# Patient Record
Sex: MALE | Race: BLACK OR AFRICAN AMERICAN | NOT HISPANIC OR LATINO | Employment: FULL TIME | RURAL
[De-identification: names, ages, dates, MRNs, and addresses within clinical notes are randomized per-mention and may not be internally consistent; named-entity substitution may affect disease eponyms.]

---

## 2021-04-18 ENCOUNTER — OFFICE VISIT (OUTPATIENT)
Dept: FAMILY MEDICINE | Facility: CLINIC | Age: 56
End: 2021-04-18
Payer: COMMERCIAL

## 2021-04-18 VITALS
HEART RATE: 61 BPM | OXYGEN SATURATION: 98 % | WEIGHT: 225 LBS | HEIGHT: 75 IN | DIASTOLIC BLOOD PRESSURE: 94 MMHG | TEMPERATURE: 98 F | RESPIRATION RATE: 18 BRPM | BODY MASS INDEX: 27.98 KG/M2 | SYSTOLIC BLOOD PRESSURE: 159 MMHG

## 2021-04-18 DIAGNOSIS — I10 ESSENTIAL HYPERTENSION: Primary | ICD-10-CM

## 2021-04-18 LAB
ANION GAP SERPL CALCULATED.3IONS-SCNC: 11 MMOL/L (ref 7–16)
BUN SERPL-MCNC: 14 MG/DL (ref 7–18)
BUN/CREAT SERPL: 16 (ref 6–20)
CALCIUM SERPL-MCNC: 8.9 MG/DL (ref 8.5–10.1)
CHLORIDE SERPL-SCNC: 106 MMOL/L (ref 98–107)
CHOLEST SERPL-MCNC: 244 MG/DL (ref 0–200)
CHOLEST/HDLC SERPL: 4.9 {RATIO}
CO2 SERPL-SCNC: 27 MMOL/L (ref 21–32)
CREAT SERPL-MCNC: 0.9 MG/DL (ref 0.7–1.3)
GLUCOSE SERPL-MCNC: 95 MG/DL (ref 74–106)
HDLC SERPL-MCNC: 50 MG/DL (ref 40–60)
LDLC SERPL CALC-MCNC: 162 MG/DL
LDLC/HDLC SERPL: 3.2 {RATIO}
NONHDLC SERPL-MCNC: 194 MG/DL
POTASSIUM SERPL-SCNC: 4.1 MMOL/L (ref 3.5–5.1)
SODIUM SERPL-SCNC: 140 MMOL/L (ref 136–145)
TRIGL SERPL-MCNC: 161 MG/DL (ref 35–150)
VLDLC SERPL-MCNC: 32 MG/DL

## 2021-04-18 PROCEDURE — 80048 BASIC METABOLIC PANEL: ICD-10-PCS | Mod: QW,,, | Performed by: CLINICAL MEDICAL LABORATORY

## 2021-04-18 PROCEDURE — 80048 BASIC METABOLIC PNL TOTAL CA: CPT | Mod: QW,,, | Performed by: CLINICAL MEDICAL LABORATORY

## 2021-04-18 PROCEDURE — 99203 PR OFFICE/OUTPT VISIT, NEW, LEVL III, 30-44 MIN: ICD-10-PCS | Mod: ,,, | Performed by: FAMILY MEDICINE

## 2021-04-18 PROCEDURE — 99203 OFFICE O/P NEW LOW 30 MIN: CPT | Mod: ,,, | Performed by: FAMILY MEDICINE

## 2021-04-18 PROCEDURE — 80061 LIPID PANEL: CPT | Mod: QW,,, | Performed by: CLINICAL MEDICAL LABORATORY

## 2021-04-18 PROCEDURE — 80061 LIPID PANEL: ICD-10-PCS | Mod: QW,,, | Performed by: CLINICAL MEDICAL LABORATORY

## 2021-04-18 RX ORDER — AMLODIPINE BESYLATE 5 MG/1
5 TABLET ORAL DAILY
Qty: 30 TABLET | Refills: 5 | Status: SHIPPED | OUTPATIENT
Start: 2021-04-18 | End: 2022-04-18

## 2021-04-23 RX ORDER — ATORVASTATIN CALCIUM 20 MG/1
20 TABLET, FILM COATED ORAL DAILY
Qty: 30 TABLET | Refills: 5 | Status: SHIPPED | OUTPATIENT
Start: 2021-04-23 | End: 2021-10-13 | Stop reason: SDUPTHER

## 2021-05-01 ENCOUNTER — OFFICE VISIT (OUTPATIENT)
Dept: FAMILY MEDICINE | Facility: CLINIC | Age: 56
End: 2021-05-01
Payer: COMMERCIAL

## 2021-05-01 VITALS
WEIGHT: 247.19 LBS | DIASTOLIC BLOOD PRESSURE: 92 MMHG | TEMPERATURE: 97 F | HEIGHT: 75 IN | BODY MASS INDEX: 30.73 KG/M2 | HEART RATE: 68 BPM | SYSTOLIC BLOOD PRESSURE: 142 MMHG | RESPIRATION RATE: 16 BRPM | OXYGEN SATURATION: 99 %

## 2021-05-01 DIAGNOSIS — I10 ESSENTIAL HYPERTENSION: ICD-10-CM

## 2021-05-01 DIAGNOSIS — M19.90 ARTHRITIS: ICD-10-CM

## 2021-05-01 DIAGNOSIS — M25.562 ACUTE PAIN OF LEFT KNEE: Primary | ICD-10-CM

## 2021-05-01 PROCEDURE — 99214 PR OFFICE/OUTPT VISIT, EST, LEVL IV, 30-39 MIN: ICD-10-PCS | Mod: 25,,, | Performed by: FAMILY MEDICINE

## 2021-05-01 PROCEDURE — 96372 PR INJECTION,THERAP/PROPH/DIAG2ST, IM OR SUBCUT: ICD-10-PCS | Mod: ,,, | Performed by: FAMILY MEDICINE

## 2021-05-01 PROCEDURE — 99214 OFFICE O/P EST MOD 30 MIN: CPT | Mod: 25,,, | Performed by: FAMILY MEDICINE

## 2021-05-01 PROCEDURE — 96372 THER/PROPH/DIAG INJ SC/IM: CPT | Mod: ,,, | Performed by: FAMILY MEDICINE

## 2021-05-01 RX ORDER — AMLODIPINE BESYLATE 10 MG/1
10 TABLET ORAL DAILY
Qty: 90 TABLET | Refills: 1 | Status: SHIPPED | OUTPATIENT
Start: 2021-05-01 | End: 2021-10-13 | Stop reason: SDUPTHER

## 2021-05-01 RX ORDER — TIZANIDINE 4 MG/1
4 TABLET ORAL 3 TIMES DAILY PRN
Qty: 20 TABLET | Refills: 0 | Status: SHIPPED | OUTPATIENT
Start: 2021-05-01 | End: 2021-05-11

## 2021-05-01 RX ORDER — IBUPROFEN 600 MG/1
600 TABLET ORAL EVERY 6 HOURS PRN
Qty: 20 TABLET | Refills: 0 | Status: SHIPPED | OUTPATIENT
Start: 2021-05-01 | End: 2022-08-09 | Stop reason: ALTCHOICE

## 2021-05-01 RX ORDER — KETOROLAC TROMETHAMINE 30 MG/ML
60 INJECTION, SOLUTION INTRAMUSCULAR; INTRAVENOUS
Status: COMPLETED | OUTPATIENT
Start: 2021-05-01 | End: 2021-05-01

## 2021-05-01 RX ORDER — PREDNISONE 20 MG/1
20 TABLET ORAL DAILY
Qty: 5 TABLET | Refills: 0 | Status: SHIPPED | OUTPATIENT
Start: 2021-05-01 | End: 2021-05-06

## 2021-05-01 RX ORDER — DEXAMETHASONE SODIUM PHOSPHATE 4 MG/ML
4 INJECTION, SOLUTION INTRA-ARTICULAR; INTRALESIONAL; INTRAMUSCULAR; INTRAVENOUS; SOFT TISSUE
Status: COMPLETED | OUTPATIENT
Start: 2021-05-01 | End: 2021-05-01

## 2021-05-01 RX ADMIN — KETOROLAC TROMETHAMINE 60 MG: 30 INJECTION, SOLUTION INTRAMUSCULAR; INTRAVENOUS at 01:05

## 2021-05-01 RX ADMIN — DEXAMETHASONE SODIUM PHOSPHATE 4 MG: 4 INJECTION, SOLUTION INTRA-ARTICULAR; INTRALESIONAL; INTRAMUSCULAR; INTRAVENOUS; SOFT TISSUE at 01:05

## 2021-05-10 ENCOUNTER — TELEPHONE (OUTPATIENT)
Dept: FAMILY MEDICINE | Facility: CLINIC | Age: 56
End: 2021-05-10

## 2021-05-11 ENCOUNTER — TELEPHONE (OUTPATIENT)
Dept: FAMILY MEDICINE | Facility: CLINIC | Age: 56
End: 2021-05-11

## 2021-06-27 ENCOUNTER — OFFICE VISIT (OUTPATIENT)
Dept: FAMILY MEDICINE | Facility: CLINIC | Age: 56
End: 2021-06-27
Payer: COMMERCIAL

## 2021-06-27 VITALS
TEMPERATURE: 98 F | BODY MASS INDEX: 30.71 KG/M2 | WEIGHT: 247 LBS | SYSTOLIC BLOOD PRESSURE: 142 MMHG | HEART RATE: 68 BPM | OXYGEN SATURATION: 99 % | RESPIRATION RATE: 18 BRPM | HEIGHT: 75 IN | DIASTOLIC BLOOD PRESSURE: 78 MMHG

## 2021-06-27 DIAGNOSIS — J32.9 SINUSITIS, UNSPECIFIED CHRONICITY, UNSPECIFIED LOCATION: Primary | ICD-10-CM

## 2021-06-27 PROCEDURE — 96372 PR INJECTION,THERAP/PROPH/DIAG2ST, IM OR SUBCUT: ICD-10-PCS | Mod: ,,, | Performed by: FAMILY MEDICINE

## 2021-06-27 PROCEDURE — 99214 PR OFFICE/OUTPT VISIT, EST, LEVL IV, 30-39 MIN: ICD-10-PCS | Mod: 25,,, | Performed by: FAMILY MEDICINE

## 2021-06-27 PROCEDURE — 99214 OFFICE O/P EST MOD 30 MIN: CPT | Mod: 25,,, | Performed by: FAMILY MEDICINE

## 2021-06-27 PROCEDURE — 99051 PR MEDICAL SERVICES, EVE/WKEND/HOLIDAY: ICD-10-PCS | Mod: ,,, | Performed by: FAMILY MEDICINE

## 2021-06-27 PROCEDURE — 99051 MED SERV EVE/WKEND/HOLIDAY: CPT | Mod: ,,, | Performed by: FAMILY MEDICINE

## 2021-06-27 PROCEDURE — 96372 THER/PROPH/DIAG INJ SC/IM: CPT | Mod: ,,, | Performed by: FAMILY MEDICINE

## 2021-06-27 RX ORDER — CEFTRIAXONE 1 G/1
1 INJECTION, POWDER, FOR SOLUTION INTRAMUSCULAR; INTRAVENOUS
Status: COMPLETED | OUTPATIENT
Start: 2021-06-27 | End: 2021-06-27

## 2021-06-27 RX ORDER — DEXAMETHASONE SODIUM PHOSPHATE 4 MG/ML
4 INJECTION, SOLUTION INTRA-ARTICULAR; INTRALESIONAL; INTRAMUSCULAR; INTRAVENOUS; SOFT TISSUE
Status: COMPLETED | OUTPATIENT
Start: 2021-06-27 | End: 2021-06-27

## 2021-06-27 RX ORDER — AMOXICILLIN AND CLAVULANATE POTASSIUM 875; 125 MG/1; MG/1
1 TABLET, FILM COATED ORAL 2 TIMES DAILY
Qty: 14 TABLET | Refills: 0 | Status: SHIPPED | OUTPATIENT
Start: 2021-06-27 | End: 2021-07-04

## 2021-06-27 RX ORDER — PREDNISONE 20 MG/1
20 TABLET ORAL DAILY
Qty: 5 TABLET | Refills: 0 | Status: SHIPPED | OUTPATIENT
Start: 2021-06-27 | End: 2021-07-02

## 2021-06-27 RX ADMIN — CEFTRIAXONE 1 G: 1 INJECTION, POWDER, FOR SOLUTION INTRAMUSCULAR; INTRAVENOUS at 10:06

## 2021-06-27 RX ADMIN — DEXAMETHASONE SODIUM PHOSPHATE 4 MG: 4 INJECTION, SOLUTION INTRA-ARTICULAR; INTRALESIONAL; INTRAMUSCULAR; INTRAVENOUS; SOFT TISSUE at 10:06

## 2021-10-12 ENCOUNTER — CLINICAL SUPPORT (OUTPATIENT)
Dept: PRIMARY CARE CLINIC | Facility: CLINIC | Age: 56
End: 2021-10-12

## 2021-10-12 DIAGNOSIS — Z02.89 ENCOUNTER FOR PHYSICAL EXAMINATION RELATED TO EMPLOYMENT: ICD-10-CM

## 2021-10-12 PROCEDURE — 99499 PR PHYSICAL - BASIC NON DOT/CDL: ICD-10-PCS | Mod: ,,, | Performed by: NURSE PRACTITIONER

## 2021-10-12 PROCEDURE — 99499 UNLISTED E&M SERVICE: CPT | Mod: ,,, | Performed by: NURSE PRACTITIONER

## 2021-10-13 ENCOUNTER — OFFICE VISIT (OUTPATIENT)
Dept: FAMILY MEDICINE | Facility: CLINIC | Age: 56
End: 2021-10-13
Payer: COMMERCIAL

## 2021-10-13 VITALS
DIASTOLIC BLOOD PRESSURE: 80 MMHG | RESPIRATION RATE: 18 BRPM | OXYGEN SATURATION: 98 % | BODY MASS INDEX: 30.85 KG/M2 | HEART RATE: 75 BPM | TEMPERATURE: 97 F | SYSTOLIC BLOOD PRESSURE: 130 MMHG | WEIGHT: 246.81 LBS

## 2021-10-13 DIAGNOSIS — I10 PRIMARY HYPERTENSION: ICD-10-CM

## 2021-10-13 DIAGNOSIS — E78.5 HYPERLIPIDEMIA, UNSPECIFIED HYPERLIPIDEMIA TYPE: ICD-10-CM

## 2021-10-13 DIAGNOSIS — R55 SYNCOPE, UNSPECIFIED SYNCOPE TYPE: Primary | ICD-10-CM

## 2021-10-13 LAB
ALBUMIN SERPL BCP-MCNC: 4.1 G/DL (ref 3.5–5)
ALBUMIN/GLOB SERPL: 1 {RATIO}
ALP SERPL-CCNC: 67 U/L (ref 45–115)
ALT SERPL W P-5'-P-CCNC: 22 U/L (ref 16–61)
ANION GAP SERPL CALCULATED.3IONS-SCNC: 7 MMOL/L (ref 7–16)
AST SERPL W P-5'-P-CCNC: 11 U/L (ref 15–37)
BASOPHILS # BLD AUTO: 0.03 K/UL (ref 0–0.2)
BASOPHILS NFR BLD AUTO: 0.6 % (ref 0–1)
BILIRUB SERPL-MCNC: 0.4 MG/DL (ref 0–1.2)
BUN SERPL-MCNC: 15 MG/DL (ref 7–18)
BUN/CREAT SERPL: 15 (ref 6–20)
CALCIUM SERPL-MCNC: 9.6 MG/DL (ref 8.5–10.1)
CHLORIDE SERPL-SCNC: 107 MMOL/L (ref 98–107)
CO2 SERPL-SCNC: 27 MMOL/L (ref 21–32)
CREAT SERPL-MCNC: 0.99 MG/DL (ref 0.7–1.3)
DIFFERENTIAL METHOD BLD: ABNORMAL
EOSINOPHIL # BLD AUTO: 0.14 K/UL (ref 0–0.5)
EOSINOPHIL NFR BLD AUTO: 2.9 % (ref 1–4)
ERYTHROCYTE [DISTWIDTH] IN BLOOD BY AUTOMATED COUNT: 14 % (ref 11.5–14.5)
EST. AVERAGE GLUCOSE BLD GHB EST-MCNC: 100 MG/DL
GLOBULIN SER-MCNC: 4.1 G/DL (ref 2–4)
GLUCOSE SERPL-MCNC: 95 MG/DL (ref 74–106)
GLUCOSE SERPL-MCNC: 99 MG/DL (ref 70–110)
HBA1C MFR BLD HPLC: 5.6 % (ref 4.5–6.6)
HCT VFR BLD AUTO: 43.6 % (ref 40–54)
HGB BLD-MCNC: 14.5 G/DL (ref 13.5–18)
IMM GRANULOCYTES # BLD AUTO: 0.01 K/UL (ref 0–0.04)
IMM GRANULOCYTES NFR BLD: 0.2 % (ref 0–0.4)
LYMPHOCYTES # BLD AUTO: 2.42 K/UL (ref 1–4.8)
LYMPHOCYTES NFR BLD AUTO: 50.3 % (ref 27–41)
MCH RBC QN AUTO: 30.3 PG (ref 27–31)
MCHC RBC AUTO-ENTMCNC: 33.3 G/DL (ref 32–36)
MCV RBC AUTO: 91 FL (ref 80–96)
MONOCYTES # BLD AUTO: 0.44 K/UL (ref 0–0.8)
MONOCYTES NFR BLD AUTO: 9.1 % (ref 2–6)
MPC BLD CALC-MCNC: 10.7 FL (ref 9.4–12.4)
NEUTROPHILS # BLD AUTO: 1.77 K/UL (ref 1.8–7.7)
NEUTROPHILS NFR BLD AUTO: 36.9 % (ref 53–65)
NRBC # BLD AUTO: 0 X10E3/UL
NRBC, AUTO (.00): 0 %
PLATELET # BLD AUTO: 242 K/UL (ref 150–400)
POTASSIUM SERPL-SCNC: 3.9 MMOL/L (ref 3.5–5.1)
PROT SERPL-MCNC: 8.2 G/DL (ref 6.4–8.2)
RBC # BLD AUTO: 4.79 M/UL (ref 4.6–6.2)
SODIUM SERPL-SCNC: 137 MMOL/L (ref 136–145)
WBC # BLD AUTO: 4.81 K/UL (ref 4.5–11)

## 2021-10-13 PROCEDURE — 1160F RVW MEDS BY RX/DR IN RCRD: CPT | Mod: ,,, | Performed by: FAMILY MEDICINE

## 2021-10-13 PROCEDURE — 3079F PR MOST RECENT DIASTOLIC BLOOD PRESSURE 80-89 MM HG: ICD-10-PCS | Mod: ,,, | Performed by: FAMILY MEDICINE

## 2021-10-13 PROCEDURE — 3044F PR MOST RECENT HEMOGLOBIN A1C LEVEL <7.0%: ICD-10-PCS | Mod: ,,, | Performed by: FAMILY MEDICINE

## 2021-10-13 PROCEDURE — 1159F PR MEDICATION LIST DOCUMENTED IN MEDICAL RECORD: ICD-10-PCS | Mod: ,,, | Performed by: FAMILY MEDICINE

## 2021-10-13 PROCEDURE — 3008F BODY MASS INDEX DOCD: CPT | Mod: ,,, | Performed by: FAMILY MEDICINE

## 2021-10-13 PROCEDURE — 1159F MED LIST DOCD IN RCRD: CPT | Mod: ,,, | Performed by: FAMILY MEDICINE

## 2021-10-13 PROCEDURE — 80053 COMPREHENSIVE METABOLIC PANEL: ICD-10-PCS | Mod: ICN,,, | Performed by: CLINICAL MEDICAL LABORATORY

## 2021-10-13 PROCEDURE — 85025 COMPLETE CBC W/AUTO DIFF WBC: CPT | Mod: ICN,,, | Performed by: CLINICAL MEDICAL LABORATORY

## 2021-10-13 PROCEDURE — 83036 HEMOGLOBIN A1C: ICD-10-PCS | Mod: ICN,,, | Performed by: CLINICAL MEDICAL LABORATORY

## 2021-10-13 PROCEDURE — 3008F PR BODY MASS INDEX (BMI) DOCUMENTED: ICD-10-PCS | Mod: ,,, | Performed by: FAMILY MEDICINE

## 2021-10-13 PROCEDURE — 3044F HG A1C LEVEL LT 7.0%: CPT | Mod: ,,, | Performed by: FAMILY MEDICINE

## 2021-10-13 PROCEDURE — 85025 CBC WITH DIFFERENTIAL: ICD-10-PCS | Mod: ICN,,, | Performed by: CLINICAL MEDICAL LABORATORY

## 2021-10-13 PROCEDURE — 3079F DIAST BP 80-89 MM HG: CPT | Mod: ,,, | Performed by: FAMILY MEDICINE

## 2021-10-13 PROCEDURE — 99214 OFFICE O/P EST MOD 30 MIN: CPT | Mod: ,,, | Performed by: FAMILY MEDICINE

## 2021-10-13 PROCEDURE — 3075F PR MOST RECENT SYSTOLIC BLOOD PRESS GE 130-139MM HG: ICD-10-PCS | Mod: ,,, | Performed by: FAMILY MEDICINE

## 2021-10-13 PROCEDURE — 3075F SYST BP GE 130 - 139MM HG: CPT | Mod: ,,, | Performed by: FAMILY MEDICINE

## 2021-10-13 PROCEDURE — 83036 HEMOGLOBIN GLYCOSYLATED A1C: CPT | Mod: ICN,,, | Performed by: CLINICAL MEDICAL LABORATORY

## 2021-10-13 PROCEDURE — 99214 PR OFFICE/OUTPT VISIT, EST, LEVL IV, 30-39 MIN: ICD-10-PCS | Mod: ,,, | Performed by: FAMILY MEDICINE

## 2021-10-13 PROCEDURE — 80053 COMPREHEN METABOLIC PANEL: CPT | Mod: ICN,,, | Performed by: CLINICAL MEDICAL LABORATORY

## 2021-10-13 PROCEDURE — 1160F PR REVIEW ALL MEDS BY PRESCRIBER/CLIN PHARMACIST DOCUMENTED: ICD-10-PCS | Mod: ,,, | Performed by: FAMILY MEDICINE

## 2021-10-13 RX ORDER — AMLODIPINE BESYLATE 10 MG/1
10 TABLET ORAL DAILY
Qty: 90 TABLET | Refills: 1 | Status: SHIPPED | OUTPATIENT
Start: 2021-10-13 | End: 2022-05-25 | Stop reason: SDUPTHER

## 2021-10-13 RX ORDER — ATORVASTATIN CALCIUM 20 MG/1
20 TABLET, FILM COATED ORAL DAILY
Qty: 90 TABLET | Refills: 1 | Status: SHIPPED | OUTPATIENT
Start: 2021-10-13 | End: 2022-05-25 | Stop reason: SDUPTHER

## 2021-11-02 ENCOUNTER — LAB VISIT (OUTPATIENT)
Dept: PRIMARY CARE CLINIC | Facility: CLINIC | Age: 56
End: 2021-11-02

## 2021-11-02 DIAGNOSIS — Z02.1 DRUG SCREENING, PRE-EMPLOYMENT: Primary | ICD-10-CM

## 2021-11-02 PROCEDURE — 99000 SPECIMEN HANDLING OFFICE-LAB: CPT | Mod: ,,, | Performed by: NURSE PRACTITIONER

## 2021-11-02 PROCEDURE — 99000 PR SPECIMEN HANDLING,DR OFF->LAB: ICD-10-PCS | Mod: ,,, | Performed by: NURSE PRACTITIONER

## 2022-05-25 ENCOUNTER — OFFICE VISIT (OUTPATIENT)
Dept: FAMILY MEDICINE | Facility: CLINIC | Age: 57
End: 2022-05-25
Payer: COMMERCIAL

## 2022-05-25 VITALS
BODY MASS INDEX: 30.59 KG/M2 | DIASTOLIC BLOOD PRESSURE: 81 MMHG | WEIGHT: 246 LBS | HEART RATE: 63 BPM | SYSTOLIC BLOOD PRESSURE: 139 MMHG | TEMPERATURE: 99 F | OXYGEN SATURATION: 99 % | HEIGHT: 75 IN | RESPIRATION RATE: 16 BRPM

## 2022-05-25 DIAGNOSIS — I10 PRIMARY HYPERTENSION: ICD-10-CM

## 2022-05-25 DIAGNOSIS — G89.29 CHRONIC PAIN OF LEFT KNEE: Primary | ICD-10-CM

## 2022-05-25 DIAGNOSIS — M25.562 CHRONIC PAIN OF LEFT KNEE: Primary | ICD-10-CM

## 2022-05-25 DIAGNOSIS — E78.5 HYPERLIPIDEMIA, UNSPECIFIED HYPERLIPIDEMIA TYPE: ICD-10-CM

## 2022-05-25 PROCEDURE — 3075F PR MOST RECENT SYSTOLIC BLOOD PRESS GE 130-139MM HG: ICD-10-PCS | Mod: ,,, | Performed by: FAMILY MEDICINE

## 2022-05-25 PROCEDURE — 99214 OFFICE O/P EST MOD 30 MIN: CPT | Mod: ,,, | Performed by: FAMILY MEDICINE

## 2022-05-25 PROCEDURE — 99214 PR OFFICE/OUTPT VISIT, EST, LEVL IV, 30-39 MIN: ICD-10-PCS | Mod: ,,, | Performed by: FAMILY MEDICINE

## 2022-05-25 PROCEDURE — 3079F DIAST BP 80-89 MM HG: CPT | Mod: ,,, | Performed by: FAMILY MEDICINE

## 2022-05-25 PROCEDURE — 1159F PR MEDICATION LIST DOCUMENTED IN MEDICAL RECORD: ICD-10-PCS | Mod: ,,, | Performed by: FAMILY MEDICINE

## 2022-05-25 PROCEDURE — 3008F PR BODY MASS INDEX (BMI) DOCUMENTED: ICD-10-PCS | Mod: ,,, | Performed by: FAMILY MEDICINE

## 2022-05-25 PROCEDURE — 3075F SYST BP GE 130 - 139MM HG: CPT | Mod: ,,, | Performed by: FAMILY MEDICINE

## 2022-05-25 PROCEDURE — 1160F PR REVIEW ALL MEDS BY PRESCRIBER/CLIN PHARMACIST DOCUMENTED: ICD-10-PCS | Mod: ,,, | Performed by: FAMILY MEDICINE

## 2022-05-25 PROCEDURE — 1160F RVW MEDS BY RX/DR IN RCRD: CPT | Mod: ,,, | Performed by: FAMILY MEDICINE

## 2022-05-25 PROCEDURE — 3079F PR MOST RECENT DIASTOLIC BLOOD PRESSURE 80-89 MM HG: ICD-10-PCS | Mod: ,,, | Performed by: FAMILY MEDICINE

## 2022-05-25 PROCEDURE — 3008F BODY MASS INDEX DOCD: CPT | Mod: ,,, | Performed by: FAMILY MEDICINE

## 2022-05-25 PROCEDURE — 1159F MED LIST DOCD IN RCRD: CPT | Mod: ,,, | Performed by: FAMILY MEDICINE

## 2022-05-25 RX ORDER — ATORVASTATIN CALCIUM 20 MG/1
20 TABLET, FILM COATED ORAL DAILY
Qty: 90 TABLET | Refills: 1 | Status: SHIPPED | OUTPATIENT
Start: 2022-05-25 | End: 2022-12-12 | Stop reason: SDUPTHER

## 2022-05-25 RX ORDER — AMLODIPINE BESYLATE 10 MG/1
10 TABLET ORAL DAILY
Qty: 90 TABLET | Refills: 1 | Status: SHIPPED | OUTPATIENT
Start: 2022-05-25 | End: 2022-12-12 | Stop reason: SDUPTHER

## 2022-05-25 NOTE — PROGRESS NOTES
Subjective:       Patient ID: Mukesh Loera is a 57 y.o. male.    Chief Complaint: Medication Refill (Refill on norvasc and lipitor. Wants referral to orthopedics for left knee pain)    Needs refill of bp and cholesterol meds, needs referral for left knee pain, this is a chronic condition.     Review of Systems   Constitutional: Negative for activity change, appetite change, chills, diaphoresis, fatigue, fever and unexpected weight change.   HENT: Negative for nasal congestion, dental problem, drooling, ear discharge, ear pain, facial swelling, hearing loss, mouth sores, nosebleeds, postnasal drip, rhinorrhea, sinus pressure/congestion, sneezing, sore throat, tinnitus, trouble swallowing, voice change and goiter.    Eyes: Negative for photophobia, pain, discharge, redness, itching and visual disturbance.   Respiratory: Negative for apnea, cough, choking, chest tightness, shortness of breath, wheezing and stridor.    Cardiovascular: Negative for chest pain, palpitations, leg swelling and claudication.   Gastrointestinal: Negative for abdominal distention, abdominal pain, anal bleeding, blood in stool, change in bowel habit, constipation, diarrhea, nausea, vomiting, reflux, fecal incontinence and change in bowel habit.   Endocrine: Negative for cold intolerance, heat intolerance, polydipsia, polyphagia and polyuria.   Genitourinary: Negative for bladder incontinence, decreased urine volume, difficulty urinating, discharge, dysuria, enuresis, erectile dysfunction, flank pain, frequency, genital sores, hematuria, penile pain, testicular pain and urgency.   Musculoskeletal: Positive for arthralgias and leg pain. Negative for back pain, gait problem, joint swelling, myalgias, neck pain, neck stiffness and joint deformity.   Integumentary:  Negative for pallor, rash, wound and mole/lesion.   Allergic/Immunologic: Negative for environmental allergies, food allergies and frequent infections.   Neurological: Negative  for dizziness, vertigo, tremors, seizures, syncope, facial asymmetry, speech difficulty, weakness, light-headedness, numbness, headaches, disturbances in coordination, memory loss and coordination difficulties.   Hematological: Negative for adenopathy. Does not bruise/bleed easily.   Psychiatric/Behavioral: Negative for agitation, behavioral problems, confusion, decreased concentration, dysphoric mood, hallucinations, self-injury, sleep disturbance and suicidal ideas. The patient is not nervous/anxious and is not hyperactive.          Objective:      Physical Exam  Vitals reviewed.   Constitutional:       Appearance: Normal appearance. He is normal weight.   HENT:      Head: Normocephalic and atraumatic.      Right Ear: Tympanic membrane and ear canal normal.      Left Ear: Tympanic membrane, ear canal and external ear normal.      Nose: Nose normal.      Mouth/Throat:      Mouth: Mucous membranes are moist.      Pharynx: Oropharynx is clear.   Eyes:      Extraocular Movements: Extraocular movements intact.      Conjunctiva/sclera: Conjunctivae normal.      Pupils: Pupils are equal, round, and reactive to light.   Cardiovascular:      Rate and Rhythm: Normal rate and regular rhythm.      Pulses: Normal pulses.      Heart sounds: Normal heart sounds.   Pulmonary:      Effort: Pulmonary effort is normal.      Breath sounds: Normal breath sounds.   Abdominal:      General: Abdomen is flat. Bowel sounds are normal.      Palpations: Abdomen is soft.   Musculoskeletal:         General: Tenderness present. Normal range of motion.      Cervical back: Normal range of motion and neck supple.      Comments: Left knee crepitus on flexion and extension.    Skin:     General: Skin is warm and dry.   Neurological:      General: No focal deficit present.      Mental Status: He is alert and oriented to person, place, and time. Mental status is at baseline.   Psychiatric:         Mood and Affect: Mood normal.         Behavior:  Behavior normal.         Thought Content: Thought content normal.         Judgment: Judgment normal.         Assessment:       1. Chronic pain of left knee    2. Primary hypertension    3. Hyperlipidemia, unspecified hyperlipidemia type        Plan:     Chronic pain of left knee  -     Ambulatory referral/consult to Orthopedics; Future; Expected date: 06/01/2022    Primary hypertension    Hyperlipidemia, unspecified hyperlipidemia type    Other orders  -     amLODIPine (NORVASC) 10 MG tablet; Take 1 tablet (10 mg total) by mouth once daily.  Dispense: 90 tablet; Refill: 1  -     atorvastatin (LIPITOR) 20 MG tablet; Take 1 tablet (20 mg total) by mouth once daily.  Dispense: 90 tablet; Refill: 1

## 2022-06-03 ENCOUNTER — HOSPITAL ENCOUNTER (OUTPATIENT)
Dept: RADIOLOGY | Facility: HOSPITAL | Age: 57
Discharge: HOME | End: 2022-06-03
Attending: ORTHOPAEDIC SURGERY
Payer: COMMERCIAL

## 2022-06-03 DIAGNOSIS — M25.562 ACUTE PAIN OF LEFT KNEE: ICD-10-CM

## 2022-06-03 PROBLEM — G89.29 CHRONIC PAIN OF LEFT KNEE: Status: ACTIVE | Noted: 2022-06-03

## 2022-06-03 PROCEDURE — 73564 X-RAY EXAM KNEE 4 OR MORE: CPT | Mod: TC,LT

## 2022-11-03 PROBLEM — M17.12 PRIMARY OSTEOARTHRITIS OF LEFT KNEE: Status: ACTIVE | Noted: 2022-11-03

## 2022-11-03 NOTE — H&P (VIEW-ONLY)
CC:   Chief Complaint   Patient presents with    Follow-up     RECHECK LT KNEE AFTER MOBIC         PREVIOUS INFO:  HISTORY:   6/3/2022    Mukesh Loera  is a 57 y.o. patient works for an asphalt company on roads he is having left knee pain has been bothering him for up to 3 years he uses Voltaren gel is not doing the trick significant problems pain at night stiffness on rising affecting all activities this is his left knee the right knee the he is taking Tylenol in addition Voltaren gel         HISTORY:   11/3/2022    Mukesh Loera  is a 57 y.o. talked about a total knee last visit placed him on Mobic.  Mobic has helped.  But he still has pain with all activities pain at night stiffness on rising hard to do his job [employer] in going the store etc..  Once again this is his left knee      PAST MEDICAL HISTORY:   Past Medical History:   Diagnosis Date    Hypertension           PAST SURGICAL HISTORY: No past surgical history on file.       ALLERGIES: Review of patient's allergies indicates:  No Known Allergies     MEDICATIONS :    Current Outpatient Medications:     amLODIPine (NORVASC) 10 MG tablet, Take 1 tablet (10 mg total) by mouth once daily., Disp: 90 tablet, Rfl: 1    atorvastatin (LIPITOR) 20 MG tablet, Take 1 tablet (20 mg total) by mouth once daily., Disp: 90 tablet, Rfl: 1    meloxicam (MOBIC) 7.5 MG tablet, TAKE 1 TABLET BY MOUTH EVERY DAY, Disp: 30 tablet, Rfl: 1     SOCIAL HISTORY:   Social History     Socioeconomic History    Marital status:    Tobacco Use    Smoking status: Never    Smokeless tobacco: Never   Substance and Sexual Activity    Alcohol use: Yes    Drug use: Never    Sexual activity: Never        ROS    FAMILY HISTORY:   Family History   Problem Relation Age of Onset    Hypertension Mother           PHYSICAL EXAM: There were no vitals filed for this visit.            There is no height or weight on file to calculate BMI.     In general, this is a well-developed,  well-nourished male . The patient is alert, oriented and cooperative.      HEENT:  Normocephalic, atraumatic.  Extraocular movements are intact bilaterally.  The oropharynx is benign.       NECK:  Nontender with good range of motion.      PULMONARY: Respirations are even and non-labored.       CARDIOVASCULAR: Pulses regular by peripheral palpation.       ABDOMEN:  Soft, non-tender, non-distended.        EXTREMITIES:  2+ dorsalis pedis pulse 0 to about 120 medial greater than lateral joint line tenderness painful Bucky left knee    Ortho Exam      RADIOGRAPHIC FINDINGS:  Vicky 3, 2022  RADIOGRAPHIC FINDINGS:  Standing x-rays left knee there is bone on bone medial compartment knee sclerotic changes end-stage DJD no fracture dislocation seen there is a cyst in the tibial eminence region       .      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 We discussed pros and cons of each including risks pertinent to surgery including pain, infection, bleeding, damage to adjacent structures like nerves and blood vessels, failure to heal, need for future surgeries, stiffness, instability, loss of limb, anesthesia risks like stroke, blood clot, loss of life. We discussed the possibility of need for later hardware removal in the case that hardware was used. We discussed common and uncommon risks, and discussed patient specific factors that may increase the risks present with surgery. All questions were answered. The patient expressed understanding of the pros and cons of surgery and wanted to proceed with surgical treatment.       No follow-ups on file.         Damir Blakely III      (Subject to voice  recognition error, transcription service not allowed)

## 2022-11-16 ENCOUNTER — TELEPHONE (OUTPATIENT)
Dept: INTERNAL MEDICINE | Facility: CLINIC | Age: 57
End: 2022-11-16
Payer: COMMERCIAL

## 2022-11-16 NOTE — TELEPHONE ENCOUNTER
Called pt and spouse 11/15/2022 due to needing to change pre op appt on 11/23/2022. No answer from either phone number. I called pts number again today, he answered and hung up. Called spouse again today and was able to speak with her. Pts appt moved to 11/22/2022 @0800.

## 2022-11-22 ENCOUNTER — OFFICE VISIT (OUTPATIENT)
Dept: INTERNAL MEDICINE | Facility: CLINIC | Age: 57
End: 2022-11-22
Payer: COMMERCIAL

## 2022-11-22 ENCOUNTER — HOSPITAL ENCOUNTER (OUTPATIENT)
Dept: RADIOLOGY | Facility: HOSPITAL | Age: 57
Discharge: HOME | End: 2022-11-22
Attending: ORTHOPAEDIC SURGERY
Payer: COMMERCIAL

## 2022-11-22 ENCOUNTER — CLINICAL SUPPORT (OUTPATIENT)
Dept: CARDIOLOGY | Facility: CLINIC | Age: 57
End: 2022-11-22
Payer: COMMERCIAL

## 2022-11-22 VITALS
RESPIRATION RATE: 20 BRPM | HEIGHT: 75 IN | WEIGHT: 255 LBS | BODY MASS INDEX: 31.71 KG/M2 | HEART RATE: 63 BPM | OXYGEN SATURATION: 98 % | SYSTOLIC BLOOD PRESSURE: 138 MMHG | DIASTOLIC BLOOD PRESSURE: 73 MMHG

## 2022-11-22 DIAGNOSIS — M17.12 PRIMARY OSTEOARTHRITIS OF LEFT KNEE: ICD-10-CM

## 2022-11-22 DIAGNOSIS — Z01.810 PRE-OPERATIVE CARDIOVASCULAR EXAMINATION: ICD-10-CM

## 2022-11-22 DIAGNOSIS — I10 ESSENTIAL HYPERTENSION: ICD-10-CM

## 2022-11-22 DIAGNOSIS — Z01.818 PREOP EXAMINATION: Primary | ICD-10-CM

## 2022-11-22 DIAGNOSIS — Z01.811 PRE-OPERATIVE RESPIRATORY EXAMINATION: ICD-10-CM

## 2022-11-22 DIAGNOSIS — E78.5 DYSLIPIDEMIA: ICD-10-CM

## 2022-11-22 PROCEDURE — 99213 OFFICE O/P EST LOW 20 MIN: CPT | Mod: PBBFAC,25 | Performed by: INTERNAL MEDICINE

## 2022-11-22 PROCEDURE — 93010 ELECTROCARDIOGRAM REPORT: CPT | Mod: S$PBB,,, | Performed by: STUDENT IN AN ORGANIZED HEALTH CARE EDUCATION/TRAINING PROGRAM

## 2022-11-22 PROCEDURE — 3078F PR MOST RECENT DIASTOLIC BLOOD PRESSURE < 80 MM HG: ICD-10-PCS | Mod: ,,, | Performed by: INTERNAL MEDICINE

## 2022-11-22 PROCEDURE — 1159F PR MEDICATION LIST DOCUMENTED IN MEDICAL RECORD: ICD-10-PCS | Mod: ,,, | Performed by: INTERNAL MEDICINE

## 2022-11-22 PROCEDURE — 3075F PR MOST RECENT SYSTOLIC BLOOD PRESS GE 130-139MM HG: ICD-10-PCS | Mod: ,,, | Performed by: INTERNAL MEDICINE

## 2022-11-22 PROCEDURE — 3008F BODY MASS INDEX DOCD: CPT | Mod: ,,, | Performed by: INTERNAL MEDICINE

## 2022-11-22 PROCEDURE — 1159F MED LIST DOCD IN RCRD: CPT | Mod: ,,, | Performed by: INTERNAL MEDICINE

## 2022-11-22 PROCEDURE — 93010 EKG 12-LEAD: ICD-10-PCS | Mod: S$PBB,,, | Performed by: STUDENT IN AN ORGANIZED HEALTH CARE EDUCATION/TRAINING PROGRAM

## 2022-11-22 PROCEDURE — 3078F DIAST BP <80 MM HG: CPT | Mod: ,,, | Performed by: INTERNAL MEDICINE

## 2022-11-22 PROCEDURE — 99212 OFFICE O/P EST SF 10 MIN: CPT | Mod: PBBFAC,25

## 2022-11-22 PROCEDURE — 3075F SYST BP GE 130 - 139MM HG: CPT | Mod: ,,, | Performed by: INTERNAL MEDICINE

## 2022-11-22 PROCEDURE — 71046 X-RAY EXAM CHEST 2 VIEWS: CPT | Mod: TC

## 2022-11-22 PROCEDURE — 3008F PR BODY MASS INDEX (BMI) DOCUMENTED: ICD-10-PCS | Mod: ,,, | Performed by: INTERNAL MEDICINE

## 2022-11-22 PROCEDURE — 99204 OFFICE O/P NEW MOD 45 MIN: CPT | Mod: S$PBB,,, | Performed by: INTERNAL MEDICINE

## 2022-11-22 PROCEDURE — 93005 ELECTROCARDIOGRAM TRACING: CPT | Mod: PBBFAC | Performed by: STUDENT IN AN ORGANIZED HEALTH CARE EDUCATION/TRAINING PROGRAM

## 2022-11-22 PROCEDURE — 99204 PR OFFICE/OUTPT VISIT, NEW, LEVL IV, 45-59 MIN: ICD-10-PCS | Mod: S$PBB,,, | Performed by: INTERNAL MEDICINE

## 2022-11-22 NOTE — PROGRESS NOTES
Subjective:       Patient ID: Mukesh Loera is a 57 y.o. male.    Chief Complaint: Pre-op Exam (Left TKR Dr. Blakely 11/30)    The patient is a 57-year-old male the presents today for surgical preop risk stratification.  He is scheduled to undergo left total knee replacement on November 30th with Dr. Blakely.  He has a history of hypertension, dyslipidemia, low testosterone, and osteoarthritis.  No known history of coronary artery disease, cerebrovascular disease, CHF, or valvular heart disease.  He denies any chest pain at rest or with exertion.  He is able to perform greater than 4 metabolic equivalents without any symptoms.  No family history of premature coronary artery disease.  No issues with anesthesia in the past.  He denies any history of abnormal bleeding or clotting.  No known obstructive sleep apnea.  He is resting comfortably today.  He is afebrile and vital signs are stable.    Review of Systems   Constitutional:  Negative for appetite change, chills and fever.   HENT:  Negative for ear pain, hearing loss, sinus pressure/congestion and sore throat.    Eyes:  Negative for pain, redness and visual disturbance.   Respiratory:  Negative for apnea, cough, shortness of breath and wheezing.    Cardiovascular:  Negative for chest pain, palpitations and leg swelling.   Gastrointestinal:  Negative for abdominal pain, blood in stool, constipation, diarrhea and nausea.   Endocrine: Negative for cold intolerance, heat intolerance and polyuria.   Genitourinary:  Negative for dysuria and hematuria.   Musculoskeletal:  Positive for arthralgias. Negative for back pain, joint swelling, myalgias and neck pain.   Integumentary:  Negative for pallor and rash.   Allergic/Immunologic: Negative for frequent infections.   Neurological:  Negative for tremors, seizures, weakness and headaches.   Hematological:  Negative for adenopathy.   Psychiatric/Behavioral:  Negative for confusion, dysphoric mood and sleep  disturbance. The patient is not nervous/anxious.        Objective:      Physical Exam  Vitals and nursing note reviewed.   Constitutional:       General: He is not in acute distress.     Appearance: Normal appearance. He is not ill-appearing.   HENT:      Head: Normocephalic and atraumatic.      Right Ear: External ear normal.      Left Ear: External ear normal.      Nose: Nose normal.      Mouth/Throat:      Pharynx: Oropharynx is clear.   Eyes:      Extraocular Movements: Extraocular movements intact.      Conjunctiva/sclera: Conjunctivae normal.      Pupils: Pupils are equal, round, and reactive to light.   Neck:      Vascular: No carotid bruit.   Cardiovascular:      Rate and Rhythm: Normal rate and regular rhythm.      Pulses: Normal pulses.      Heart sounds: Normal heart sounds. No murmur heard.  Pulmonary:      Effort: No respiratory distress.      Breath sounds: Normal breath sounds. No wheezing or rales.   Abdominal:      General: Bowel sounds are normal.      Palpations: Abdomen is soft.   Musculoskeletal:         General: Tenderness present. Normal range of motion.      Cervical back: Normal range of motion and neck supple.      Right lower leg: No edema.      Left lower leg: No edema.   Skin:     General: Skin is warm and dry.      Capillary Refill: Capillary refill takes less than 2 seconds.      Coloration: Skin is not pale.   Neurological:      General: No focal deficit present.      Mental Status: He is alert and oriented to person, place, and time.      Cranial Nerves: No cranial nerve deficit.      Motor: No weakness.      Gait: Gait abnormal.   Psychiatric:         Mood and Affect: Mood normal.         Judgment: Judgment normal.       Assessment:       Problem List Items Addressed This Visit          Cardiac/Vascular    Essential hypertension    Dyslipidemia       Orthopedic    Primary osteoarthritis of left knee     Other Visit Diagnoses       Preop examination    -  Primary              Plan:        1. Left knee osteoarthritis-no known history of coronary artery disease, cerebrovascular disease, CHF, or valvular heart disease.  He is able to perform greater than 4 metabolic equivalents without any chest pain.  His revised cardiac risk index is class 1.  His ACS-SRC risk is 1.6% for series complication and 2.2% for any complication.  We have reviewed available lab work and imaging.  Overall he is considered low risk for this procedure.  Okay to proceed to surgery without any further testing.    2. Essential hypertension-blood pressure is 138/73.  He is on amlodipine 10 mg daily.  Continue blood pressure medications perioperatively    3. Dyslipidemia-stable on a statin

## 2022-11-30 ENCOUNTER — ANESTHESIA EVENT (OUTPATIENT)
Dept: SURGERY | Facility: HOSPITAL | Age: 57
End: 2022-11-30
Payer: COMMERCIAL

## 2022-11-30 ENCOUNTER — HOSPITAL ENCOUNTER (OUTPATIENT)
Facility: HOSPITAL | Age: 57
Discharge: HOME HEALTH | End: 2022-12-01
Attending: ORTHOPAEDIC SURGERY | Admitting: ORTHOPAEDIC SURGERY
Payer: COMMERCIAL

## 2022-11-30 ENCOUNTER — ANESTHESIA (OUTPATIENT)
Dept: SURGERY | Facility: HOSPITAL | Age: 57
End: 2022-11-30
Payer: COMMERCIAL

## 2022-11-30 DIAGNOSIS — M17.9 DJD (DEGENERATIVE JOINT DISEASE) OF KNEE: ICD-10-CM

## 2022-11-30 DIAGNOSIS — M17.12 PRIMARY OSTEOARTHRITIS OF LEFT KNEE: Primary | ICD-10-CM

## 2022-11-30 DIAGNOSIS — Z96.652 S/P TOTAL KNEE ARTHROPLASTY, LEFT: ICD-10-CM

## 2022-11-30 LAB
ABORH RETYPE: NORMAL
ANION GAP SERPL CALCULATED.3IONS-SCNC: 16 MMOL/L (ref 7–16)
BASOPHILS # BLD AUTO: 0.04 K/UL (ref 0–0.2)
BASOPHILS NFR BLD AUTO: 0.6 % (ref 0–1)
BUN SERPL-MCNC: 13 MG/DL (ref 7–18)
BUN/CREAT SERPL: 13 (ref 6–20)
CALCIUM SERPL-MCNC: 8.3 MG/DL (ref 8.5–10.1)
CHLORIDE SERPL-SCNC: 104 MMOL/L (ref 98–107)
CO2 SERPL-SCNC: 25 MMOL/L (ref 21–32)
CREAT SERPL-MCNC: 1.02 MG/DL (ref 0.7–1.3)
DIFFERENTIAL METHOD BLD: ABNORMAL
EGFR (NO RACE VARIABLE) (RUSH/TITUS): 85 ML/MIN/1.73M²
EOSINOPHIL # BLD AUTO: 0.03 K/UL (ref 0–0.5)
EOSINOPHIL NFR BLD AUTO: 0.4 % (ref 1–4)
ERYTHROCYTE [DISTWIDTH] IN BLOOD BY AUTOMATED COUNT: 13.9 % (ref 11.5–14.5)
GLUCOSE SERPL-MCNC: 150 MG/DL (ref 74–106)
HCT VFR BLD AUTO: 44.2 % (ref 40–54)
HGB BLD-MCNC: 14.3 G/DL (ref 13.5–18)
IMM GRANULOCYTES # BLD AUTO: 0.03 K/UL (ref 0–0.04)
IMM GRANULOCYTES NFR BLD: 0.4 % (ref 0–0.4)
LYMPHOCYTES # BLD AUTO: 1.27 K/UL (ref 1–4.8)
LYMPHOCYTES NFR BLD AUTO: 17.9 % (ref 27–41)
MCH RBC QN AUTO: 30 PG (ref 27–31)
MCHC RBC AUTO-ENTMCNC: 32.4 G/DL (ref 32–36)
MCV RBC AUTO: 92.9 FL (ref 80–96)
MONOCYTES # BLD AUTO: 0.09 K/UL (ref 0–0.8)
MONOCYTES NFR BLD AUTO: 1.3 % (ref 2–6)
MPC BLD CALC-MCNC: 10.4 FL (ref 9.4–12.4)
NEUTROPHILS # BLD AUTO: 5.64 K/UL (ref 1.8–7.7)
NEUTROPHILS NFR BLD AUTO: 79.4 % (ref 53–65)
NRBC # BLD AUTO: 0 X10E3/UL
NRBC, AUTO (.00): 0 %
PLATELET # BLD AUTO: 242 K/UL (ref 150–400)
POTASSIUM SERPL-SCNC: 4.1 MMOL/L (ref 3.5–5.1)
RBC # BLD AUTO: 4.76 M/UL (ref 4.6–6.2)
SODIUM SERPL-SCNC: 141 MMOL/L (ref 136–145)
WBC # BLD AUTO: 7.1 K/UL (ref 4.5–11)

## 2022-11-30 PROCEDURE — 63600175 PHARM REV CODE 636 W HCPCS: Performed by: ORTHOPAEDIC SURGERY

## 2022-11-30 PROCEDURE — 27000655: Performed by: ANESTHESIOLOGY

## 2022-11-30 PROCEDURE — 25000003 PHARM REV CODE 250

## 2022-11-30 PROCEDURE — 64447 NJX AA&/STRD FEMORAL NRV IMG: CPT | Mod: 59,LT,, | Performed by: ANESTHESIOLOGY

## 2022-11-30 PROCEDURE — 63600175 PHARM REV CODE 636 W HCPCS: Performed by: ANESTHESIOLOGY

## 2022-11-30 PROCEDURE — 94761 N-INVAS EAR/PLS OXIMETRY MLT: CPT

## 2022-11-30 PROCEDURE — 36415 COLL VENOUS BLD VENIPUNCTURE: CPT | Performed by: ORTHOPAEDIC SURGERY

## 2022-11-30 PROCEDURE — 99204 OFFICE O/P NEW MOD 45 MIN: CPT | Mod: ,,, | Performed by: INTERNAL MEDICINE

## 2022-11-30 PROCEDURE — 27201423 OPTIME MED/SURG SUP & DEVICES STERILE SUPPLY: Performed by: ORTHOPAEDIC SURGERY

## 2022-11-30 PROCEDURE — 37000008 HC ANESTHESIA 1ST 15 MINUTES: Performed by: ORTHOPAEDIC SURGERY

## 2022-11-30 PROCEDURE — D9220A PRA ANESTHESIA: Mod: ANES,,, | Performed by: ANESTHESIOLOGY

## 2022-11-30 PROCEDURE — 37000009 HC ANESTHESIA EA ADD 15 MINS: Performed by: ORTHOPAEDIC SURGERY

## 2022-11-30 PROCEDURE — 76942 ECHO GUIDE FOR BIOPSY: CPT | Mod: 26,,, | Performed by: ANESTHESIOLOGY

## 2022-11-30 PROCEDURE — 97161 PT EVAL LOW COMPLEX 20 MIN: CPT

## 2022-11-30 PROCEDURE — C1776 JOINT DEVICE (IMPLANTABLE): HCPCS | Performed by: ORTHOPAEDIC SURGERY

## 2022-11-30 PROCEDURE — 63600175 PHARM REV CODE 636 W HCPCS

## 2022-11-30 PROCEDURE — 27201960 HC SPINAL TRAY: Performed by: ANESTHESIOLOGY

## 2022-11-30 PROCEDURE — 97165 OT EVAL LOW COMPLEX 30 MIN: CPT

## 2022-11-30 PROCEDURE — D9220A PRA ANESTHESIA: ICD-10-PCS | Mod: ANES,,, | Performed by: ANESTHESIOLOGY

## 2022-11-30 PROCEDURE — D9220A PRA ANESTHESIA: Mod: CRNA,,,

## 2022-11-30 PROCEDURE — 25000003 PHARM REV CODE 250: Performed by: ANESTHESIOLOGY

## 2022-11-30 PROCEDURE — 85025 COMPLETE CBC W/AUTO DIFF WBC: CPT | Performed by: ORTHOPAEDIC SURGERY

## 2022-11-30 PROCEDURE — C1713 ANCHOR/SCREW BN/BN,TIS/BN: HCPCS | Performed by: ORTHOPAEDIC SURGERY

## 2022-11-30 PROCEDURE — 99900035 HC TECH TIME PER 15 MIN (STAT)

## 2022-11-30 PROCEDURE — 36000712 HC OR TIME LEV V 1ST 15 MIN: Performed by: ORTHOPAEDIC SURGERY

## 2022-11-30 PROCEDURE — 25000003 PHARM REV CODE 250: Performed by: ORTHOPAEDIC SURGERY

## 2022-11-30 PROCEDURE — 64447 PR NERVE BLOCK INJ, ANES/STEROID, FEMORAL, INCL IMAG GUIDANCE: ICD-10-PCS | Mod: 59,LT,, | Performed by: ANESTHESIOLOGY

## 2022-11-30 PROCEDURE — D9220A PRA ANESTHESIA: ICD-10-PCS | Mod: CRNA,,,

## 2022-11-30 PROCEDURE — 36000713 HC OR TIME LEV V EA ADD 15 MIN: Performed by: ORTHOPAEDIC SURGERY

## 2022-11-30 PROCEDURE — 27000177 HC AIRWAY, LARYNGEAL MASK: Performed by: ANESTHESIOLOGY

## 2022-11-30 PROCEDURE — 27200750 HC INSULATED NEEDLE/ STIMUPLEX: Performed by: ANESTHESIOLOGY

## 2022-11-30 PROCEDURE — 80048 BASIC METABOLIC PNL TOTAL CA: CPT | Performed by: ORTHOPAEDIC SURGERY

## 2022-11-30 PROCEDURE — 71000033 HC RECOVERY, INTIAL HOUR: Performed by: ORTHOPAEDIC SURGERY

## 2022-11-30 PROCEDURE — 27000716 HC OXISENSOR PROBE, ANY SIZE: Performed by: ANESTHESIOLOGY

## 2022-11-30 PROCEDURE — 99900031 HC PATIENT EDUCATION (STAT)

## 2022-11-30 PROCEDURE — 76942 PR U/S GUIDANCE FOR NEEDLE GUIDANCE: ICD-10-PCS | Mod: 26,,, | Performed by: ANESTHESIOLOGY

## 2022-11-30 PROCEDURE — 99204 PR OFFICE/OUTPT VISIT, NEW, LEVL IV, 45-59 MIN: ICD-10-PCS | Mod: ,,, | Performed by: INTERNAL MEDICINE

## 2022-11-30 DEVICE — PATELLA ATTUNE MEDLZD DOME 41: Type: IMPLANTABLE DEVICE | Site: KNEE | Status: FUNCTIONAL

## 2022-11-30 DEVICE — IMPLANTABLE DEVICE: Type: IMPLANTABLE DEVICE | Site: KNEE | Status: FUNCTIONAL

## 2022-11-30 DEVICE — CEMENT BONE SURG SMPLX P RADPQ: Type: IMPLANTABLE DEVICE | Site: KNEE | Status: FUNCTIONAL

## 2022-11-30 RX ORDER — CEFAZOLIN SODIUM 2 G/50ML
2 SOLUTION INTRAVENOUS
Status: COMPLETED | OUTPATIENT
Start: 2022-11-30 | End: 2022-11-30

## 2022-11-30 RX ORDER — EPHEDRINE SULFATE 50 MG/ML
INJECTION, SOLUTION INTRAVENOUS
Status: DISCONTINUED | OUTPATIENT
Start: 2022-11-30 | End: 2022-11-30

## 2022-11-30 RX ORDER — ASPIRIN 325 MG
325 TABLET, DELAYED RELEASE (ENTERIC COATED) ORAL 2 TIMES DAILY
Refills: 0
Start: 2022-11-30 | End: 2023-01-09

## 2022-11-30 RX ORDER — DEXAMETHASONE SODIUM PHOSPHATE 4 MG/ML
INJECTION, SOLUTION INTRA-ARTICULAR; INTRALESIONAL; INTRAMUSCULAR; INTRAVENOUS; SOFT TISSUE
Status: DISCONTINUED | OUTPATIENT
Start: 2022-11-30 | End: 2022-11-30

## 2022-11-30 RX ORDER — ASPIRIN 325 MG
325 TABLET, DELAYED RELEASE (ENTERIC COATED) ORAL DAILY
Status: DISCONTINUED | OUTPATIENT
Start: 2022-12-01 | End: 2022-12-01 | Stop reason: HOSPADM

## 2022-11-30 RX ORDER — HYDROMORPHONE HYDROCHLORIDE 2 MG/ML
0.5 INJECTION, SOLUTION INTRAMUSCULAR; INTRAVENOUS; SUBCUTANEOUS EVERY 5 MIN PRN
Status: DISCONTINUED | OUTPATIENT
Start: 2022-11-30 | End: 2022-11-30 | Stop reason: HOSPADM

## 2022-11-30 RX ORDER — PROPOFOL 10 MG/ML
VIAL (ML) INTRAVENOUS
Status: DISCONTINUED | OUTPATIENT
Start: 2022-11-30 | End: 2022-11-30

## 2022-11-30 RX ORDER — MORPHINE SULFATE 4 MG/ML
4 INJECTION, SOLUTION INTRAMUSCULAR; INTRAVENOUS EVERY 4 HOURS PRN
Status: DISCONTINUED | OUTPATIENT
Start: 2022-11-30 | End: 2022-12-01 | Stop reason: HOSPADM

## 2022-11-30 RX ORDER — FENTANYL CITRATE 50 UG/ML
INJECTION, SOLUTION INTRAMUSCULAR; INTRAVENOUS
Status: DISCONTINUED | OUTPATIENT
Start: 2022-11-30 | End: 2022-11-30

## 2022-11-30 RX ORDER — OXYCODONE HYDROCHLORIDE 5 MG/1
5 TABLET ORAL
Status: DISCONTINUED | OUTPATIENT
Start: 2022-11-30 | End: 2022-11-30 | Stop reason: HOSPADM

## 2022-11-30 RX ORDER — BUPIVACAINE HYDROCHLORIDE 7.5 MG/ML
INJECTION, SOLUTION EPIDURAL; RETROBULBAR
Status: COMPLETED | OUTPATIENT
Start: 2022-11-30 | End: 2022-11-30

## 2022-11-30 RX ORDER — AMLODIPINE BESYLATE 10 MG/1
10 TABLET ORAL DAILY
Status: DISCONTINUED | OUTPATIENT
Start: 2022-11-30 | End: 2022-12-01 | Stop reason: HOSPADM

## 2022-11-30 RX ORDER — ONDANSETRON 2 MG/ML
4 INJECTION INTRAMUSCULAR; INTRAVENOUS DAILY PRN
Status: DISCONTINUED | OUTPATIENT
Start: 2022-11-30 | End: 2022-11-30 | Stop reason: HOSPADM

## 2022-11-30 RX ORDER — LIDOCAINE HYDROCHLORIDE 20 MG/ML
INJECTION INTRAVENOUS
Status: DISCONTINUED | OUTPATIENT
Start: 2022-11-30 | End: 2022-11-30

## 2022-11-30 RX ORDER — KETOROLAC TROMETHAMINE 30 MG/ML
INJECTION, SOLUTION INTRAMUSCULAR; INTRAVENOUS
Status: DISCONTINUED | OUTPATIENT
Start: 2022-11-30 | End: 2022-11-30

## 2022-11-30 RX ORDER — HYDROCODONE BITARTRATE AND ACETAMINOPHEN 5; 325 MG/1; MG/1
1 TABLET ORAL EVERY 4 HOURS PRN
Status: DISCONTINUED | OUTPATIENT
Start: 2022-11-30 | End: 2022-12-01 | Stop reason: HOSPADM

## 2022-11-30 RX ORDER — SODIUM CHLORIDE, SODIUM LACTATE, POTASSIUM CHLORIDE, CALCIUM CHLORIDE 600; 310; 30; 20 MG/100ML; MG/100ML; MG/100ML; MG/100ML
125 INJECTION, SOLUTION INTRAVENOUS CONTINUOUS
Status: DISCONTINUED | OUTPATIENT
Start: 2022-11-30 | End: 2022-11-30

## 2022-11-30 RX ORDER — EPINEPHRINE 1 MG/ML
INJECTION, SOLUTION, CONCENTRATE INTRAVENOUS
Status: DISCONTINUED | OUTPATIENT
Start: 2022-11-30 | End: 2022-11-30

## 2022-11-30 RX ORDER — MEPERIDINE HYDROCHLORIDE 25 MG/ML
25 INJECTION INTRAMUSCULAR; INTRAVENOUS; SUBCUTANEOUS EVERY 10 MIN PRN
Status: DISCONTINUED | OUTPATIENT
Start: 2022-11-30 | End: 2022-11-30 | Stop reason: HOSPADM

## 2022-11-30 RX ORDER — BUPIVACAINE HYDROCHLORIDE 2.5 MG/ML
INJECTION, SOLUTION EPIDURAL; INFILTRATION; INTRACAUDAL
Status: DISCONTINUED | OUTPATIENT
Start: 2022-11-30 | End: 2022-11-30 | Stop reason: HOSPADM

## 2022-11-30 RX ORDER — MORPHINE SULFATE 10 MG/ML
4 INJECTION INTRAMUSCULAR; INTRAVENOUS; SUBCUTANEOUS EVERY 5 MIN PRN
Status: DISCONTINUED | OUTPATIENT
Start: 2022-11-30 | End: 2022-11-30 | Stop reason: HOSPADM

## 2022-11-30 RX ORDER — SODIUM CHLORIDE 9 MG/ML
INJECTION, SOLUTION INTRAVENOUS CONTINUOUS
Status: DISCONTINUED | OUTPATIENT
Start: 2022-11-30 | End: 2022-11-30

## 2022-11-30 RX ORDER — DIPHENHYDRAMINE HYDROCHLORIDE 50 MG/ML
25 INJECTION INTRAMUSCULAR; INTRAVENOUS EVERY 6 HOURS PRN
Status: DISCONTINUED | OUTPATIENT
Start: 2022-11-30 | End: 2022-11-30 | Stop reason: HOSPADM

## 2022-11-30 RX ORDER — PHENYLEPHRINE HYDROCHLORIDE 10 MG/ML
INJECTION INTRAVENOUS
Status: DISCONTINUED | OUTPATIENT
Start: 2022-11-30 | End: 2022-11-30

## 2022-11-30 RX ORDER — SODIUM CHLORIDE, SODIUM LACTATE, POTASSIUM CHLORIDE, CALCIUM CHLORIDE 600; 310; 30; 20 MG/100ML; MG/100ML; MG/100ML; MG/100ML
INJECTION, SOLUTION INTRAVENOUS CONTINUOUS
Status: DISCONTINUED | OUTPATIENT
Start: 2022-11-30 | End: 2022-12-01 | Stop reason: HOSPADM

## 2022-11-30 RX ORDER — BISACODYL 10 MG
10 SUPPOSITORY, RECTAL RECTAL DAILY PRN
Status: DISCONTINUED | OUTPATIENT
Start: 2022-11-30 | End: 2022-12-01 | Stop reason: HOSPADM

## 2022-11-30 RX ORDER — ACETAMINOPHEN 325 MG/1
650 TABLET ORAL EVERY 6 HOURS PRN
Status: DISCONTINUED | OUTPATIENT
Start: 2022-11-30 | End: 2022-12-01 | Stop reason: HOSPADM

## 2022-11-30 RX ORDER — ONDANSETRON 2 MG/ML
4 INJECTION INTRAMUSCULAR; INTRAVENOUS EVERY 8 HOURS PRN
Status: DISCONTINUED | OUTPATIENT
Start: 2022-11-30 | End: 2022-12-01 | Stop reason: HOSPADM

## 2022-11-30 RX ORDER — MORPHINE SULFATE 1 MG/ML
INJECTION, SOLUTION EPIDURAL; INTRATHECAL; INTRAVENOUS
Status: DISCONTINUED | OUTPATIENT
Start: 2022-11-30 | End: 2022-11-30 | Stop reason: HOSPADM

## 2022-11-30 RX ORDER — CEFAZOLIN SODIUM 1 G/3ML
INJECTION, POWDER, FOR SOLUTION INTRAMUSCULAR; INTRAVENOUS
Status: DISCONTINUED | OUTPATIENT
Start: 2022-11-30 | End: 2022-11-30

## 2022-11-30 RX ORDER — ATORVASTATIN CALCIUM 20 MG/1
20 TABLET, FILM COATED ORAL DAILY
Status: DISCONTINUED | OUTPATIENT
Start: 2022-11-30 | End: 2022-12-01 | Stop reason: HOSPADM

## 2022-11-30 RX ORDER — ONDANSETRON 2 MG/ML
INJECTION INTRAMUSCULAR; INTRAVENOUS
Status: DISCONTINUED | OUTPATIENT
Start: 2022-11-30 | End: 2022-11-30

## 2022-11-30 RX ORDER — OXYCODONE AND ACETAMINOPHEN 7.5; 325 MG/1; MG/1
1 TABLET ORAL EVERY 6 HOURS PRN
Qty: 20 TABLET | Refills: 0 | Status: SHIPPED | OUTPATIENT
Start: 2022-11-30 | End: 2022-12-06 | Stop reason: SDUPTHER

## 2022-11-30 RX ORDER — TRANEXAMIC ACID 100 MG/ML
INJECTION, SOLUTION INTRAVENOUS
Status: DISCONTINUED | OUTPATIENT
Start: 2022-11-30 | End: 2022-11-30

## 2022-11-30 RX ORDER — LIDOCAINE HYDROCHLORIDE 10 MG/ML
1 INJECTION, SOLUTION EPIDURAL; INFILTRATION; INTRACAUDAL; PERINEURAL ONCE AS NEEDED
Status: DISCONTINUED | OUTPATIENT
Start: 2022-11-30 | End: 2022-11-30 | Stop reason: HOSPADM

## 2022-11-30 RX ORDER — HYDRALAZINE HYDROCHLORIDE 20 MG/ML
10 INJECTION INTRAMUSCULAR; INTRAVENOUS EVERY 6 HOURS PRN
Status: DISCONTINUED | OUTPATIENT
Start: 2022-11-30 | End: 2022-12-01 | Stop reason: HOSPADM

## 2022-11-30 RX ORDER — MIDAZOLAM HYDROCHLORIDE 1 MG/ML
INJECTION INTRAMUSCULAR; INTRAVENOUS
Status: DISCONTINUED | OUTPATIENT
Start: 2022-11-30 | End: 2022-11-30

## 2022-11-30 RX ORDER — SODIUM CHLORIDE, SODIUM LACTATE, POTASSIUM CHLORIDE, CALCIUM CHLORIDE 600; 310; 30; 20 MG/100ML; MG/100ML; MG/100ML; MG/100ML
INJECTION, SOLUTION INTRAVENOUS CONTINUOUS
Status: DISCONTINUED | OUTPATIENT
Start: 2022-11-30 | End: 2022-11-30

## 2022-11-30 RX ORDER — TOBRAMYCIN 1.2 G/30ML
INJECTION, POWDER, LYOPHILIZED, FOR SOLUTION INTRAVENOUS
Status: DISCONTINUED | OUTPATIENT
Start: 2022-11-30 | End: 2022-11-30 | Stop reason: HOSPADM

## 2022-11-30 RX ORDER — ROPIVACAINE HYDROCHLORIDE 5 MG/ML
INJECTION, SOLUTION EPIDURAL; INFILTRATION; PERINEURAL
Status: COMPLETED | OUTPATIENT
Start: 2022-11-30 | End: 2022-11-30

## 2022-11-30 RX ADMIN — FENTANYL CITRATE 100 MCG: 50 INJECTION INTRAMUSCULAR; INTRAVENOUS at 08:11

## 2022-11-30 RX ADMIN — CEFAZOLIN 2 G: 1 INJECTION, POWDER, FOR SOLUTION INTRAMUSCULAR; INTRAVENOUS; PARENTERAL at 07:11

## 2022-11-30 RX ADMIN — FENTANYL CITRATE 100 MCG: 50 INJECTION INTRAMUSCULAR; INTRAVENOUS at 07:11

## 2022-11-30 RX ADMIN — TRANEXAMIC ACID 1000 MG: 100 INJECTION, SOLUTION INTRAVENOUS at 07:11

## 2022-11-30 RX ADMIN — PHENYLEPHRINE HYDROCHLORIDE 100 MCG: 10 INJECTION INTRAVENOUS at 08:11

## 2022-11-30 RX ADMIN — DEXAMETHASONE SODIUM PHOSPHATE 8 MG: 4 INJECTION, SOLUTION INTRA-ARTICULAR; INTRALESIONAL; INTRAMUSCULAR; INTRAVENOUS; SOFT TISSUE at 07:11

## 2022-11-30 RX ADMIN — ATORVASTATIN CALCIUM 20 MG: 20 TABLET, FILM COATED ORAL at 03:11

## 2022-11-30 RX ADMIN — MIDAZOLAM 2 MG: 1 INJECTION INTRAMUSCULAR; INTRAVENOUS at 07:11

## 2022-11-30 RX ADMIN — EPHEDRINE SULFATE 25 MG: 50 INJECTION INTRAVENOUS at 08:11

## 2022-11-30 RX ADMIN — SODIUM CHLORIDE: 9 INJECTION, SOLUTION INTRAVENOUS at 09:11

## 2022-11-30 RX ADMIN — SODIUM CHLORIDE: 9 INJECTION, SOLUTION INTRAVENOUS at 07:11

## 2022-11-30 RX ADMIN — DEXTROSE MONOHYDRATE 2 G: 50 INJECTION, SOLUTION INTRAVENOUS at 03:11

## 2022-11-30 RX ADMIN — ROPIVACAINE HYDROCHLORIDE 40 ML: 5 INJECTION, SOLUTION EPIDURAL; INFILTRATION; PERINEURAL at 07:11

## 2022-11-30 RX ADMIN — ONDANSETRON 8 MG: 2 INJECTION INTRAMUSCULAR; INTRAVENOUS at 07:11

## 2022-11-30 RX ADMIN — AMLODIPINE BESYLATE 10 MG: 10 TABLET ORAL at 03:11

## 2022-11-30 RX ADMIN — PROPOFOL 200 MG: 10 INJECTION, EMULSION INTRAVENOUS at 07:11

## 2022-11-30 RX ADMIN — EPHEDRINE SULFATE 25 MG: 50 INJECTION INTRAVENOUS at 07:11

## 2022-11-30 RX ADMIN — ONDANSETRON HYDROCHLORIDE 4 MG: 2 SOLUTION INTRAMUSCULAR; INTRAVENOUS at 05:11

## 2022-11-30 RX ADMIN — VANCOMYCIN HYDROCHLORIDE 1500 MG: 500 INJECTION, POWDER, LYOPHILIZED, FOR SOLUTION INTRAVENOUS at 08:11

## 2022-11-30 RX ADMIN — BUPIVACAINE HYDROCHLORIDE 1.5 ML: 7.5 INJECTION, SOLUTION EPIDURAL; RETROBULBAR at 07:11

## 2022-11-30 RX ADMIN — TRANEXAMIC ACID 1000 MG: 100 INJECTION, SOLUTION INTRAVENOUS at 09:11

## 2022-11-30 RX ADMIN — EPINEPHRINE 0.01 MCG: 1 INJECTION, SOLUTION, CONCENTRATE INTRAVENOUS at 07:11

## 2022-11-30 RX ADMIN — HYDROCODONE BITARTRATE AND ACETAMINOPHEN 1 TABLET: 5; 325 TABLET ORAL at 01:11

## 2022-11-30 RX ADMIN — DEXTROSE MONOHYDRATE 2 G: 50 INJECTION, SOLUTION INTRAVENOUS at 11:11

## 2022-11-30 RX ADMIN — HYDROCODONE BITARTRATE AND ACETAMINOPHEN 1 TABLET: 5; 325 TABLET ORAL at 09:11

## 2022-11-30 RX ADMIN — LIDOCAINE HYDROCHLORIDE 100 MG: 20 INJECTION, SOLUTION INTRAVENOUS at 07:11

## 2022-11-30 RX ADMIN — DEXAMETHASONE SODIUM PHOSPHATE 4 MG: 4 INJECTION, SOLUTION INTRA-ARTICULAR; INTRALESIONAL; INTRAMUSCULAR; INTRAVENOUS; SOFT TISSUE at 07:11

## 2022-11-30 RX ADMIN — VANCOMYCIN HYDROCHLORIDE 2000 MG: 1 INJECTION, POWDER, LYOPHILIZED, FOR SOLUTION INTRAVENOUS at 07:11

## 2022-11-30 RX ADMIN — KETOROLAC TROMETHAMINE 60 MG: 30 INJECTION, SOLUTION INTRAMUSCULAR at 07:11

## 2022-11-30 NOTE — PROGRESS NOTES
1120 RELEASED TO FLOOR RN AWAKE, ALERT WITHOUT DISTRESS NOTED. V/S 144/87-80-16-95%-98.5 TEMP. WIFE AT BEDSIDE.

## 2022-11-30 NOTE — PLAN OF CARE
Problem: Fall Injury Risk  Goal: Absence of Fall and Fall-Related Injury  Outcome: Ongoing, Progressing     Problem: Physical Therapy  Goal: Physical Therapy Goal  Description: Short Term Goals  Independent with HEP  Independent with crutchesx 200 feet FWB/WBAT: left lower extremity    Long term goals  Needed equipment for home.       Outcome: Ongoing, Progressing

## 2022-11-30 NOTE — SUBJECTIVE & OBJECTIVE
Past Medical History:   Diagnosis Date    Hypertension        History reviewed. No pertinent surgical history.    Review of patient's allergies indicates:  No Known Allergies    No current facility-administered medications on file prior to encounter.     Current Outpatient Medications on File Prior to Encounter   Medication Sig    amLODIPine (NORVASC) 10 MG tablet Take 1 tablet (10 mg total) by mouth once daily.    atorvastatin (LIPITOR) 20 MG tablet Take 1 tablet (20 mg total) by mouth once daily.    meloxicam (MOBIC) 7.5 MG tablet TAKE 1 TABLET BY MOUTH EVERY DAY     Family History       Problem Relation (Age of Onset)    Hypertension Mother          Tobacco Use    Smoking status: Never    Smokeless tobacco: Never   Substance and Sexual Activity    Alcohol use: Yes    Drug use: Not Currently     Types: Marijuana    Sexual activity: Never     Review of Systems   Constitutional: Negative.    HENT: Negative.     Eyes: Negative.    Respiratory: Negative.     Cardiovascular: Negative.    Gastrointestinal: Negative.    Endocrine: Negative.    Genitourinary: Negative.    Musculoskeletal:  Positive for arthralgias.   Allergic/Immunologic: Negative.    Neurological: Negative.    Hematological: Negative.    Psychiatric/Behavioral: Negative.     Objective:     Vital Signs (Most Recent):  Temp: 97.8 °F (36.6 °C) (11/30/22 1230)  Pulse: 74 (11/30/22 1230)  Resp: 17 (11/30/22 1304)  BP: (!) 140/88 (11/30/22 1230)  SpO2: 97 % (11/30/22 1230)   Vital Signs (24h Range):  Temp:  [97.8 °F (36.6 °C)-99.7 °F (37.6 °C)] 97.8 °F (36.6 °C)  Pulse:  [] 74  Resp:  [0-21] 17  SpO2:  [91 %-99 %] 97 %  BP: (117-140)/(71-92) 140/88     Weight: 115.7 kg (255 lb)  Body mass index is 31.87 kg/m².    Physical Exam  Constitutional:       Appearance: He is obese.   HENT:      Head: Normocephalic.      Right Ear: Tympanic membrane normal.      Left Ear: Tympanic membrane normal.      Nose: Nose normal.      Mouth/Throat:      Mouth: Mucous  membranes are moist.      Pharynx: Oropharynx is clear.   Eyes:      Conjunctiva/sclera: Conjunctivae normal.      Pupils: Pupils are equal, round, and reactive to light.   Cardiovascular:      Rate and Rhythm: Normal rate and regular rhythm.      Pulses: Normal pulses.      Heart sounds: Normal heart sounds.   Pulmonary:      Effort: Pulmonary effort is normal.      Breath sounds: Normal breath sounds.   Abdominal:      General: Abdomen is flat. Bowel sounds are normal.      Palpations: Abdomen is soft.   Musculoskeletal:         General: Tenderness present. Normal range of motion.      Cervical back: Normal range of motion.      Comments: LLE   Skin:     General: Skin is warm and dry.      Capillary Refill: Capillary refill takes less than 2 seconds.   Neurological:      Mental Status: He is alert and oriented to person, place, and time.   Psychiatric:         Mood and Affect: Mood normal.       Significant Labs: All pertinent labs within the past 24 hours have been reviewed.  BMP:   Recent Labs   Lab 11/30/22  1106   *      K 4.1      CO2 25   BUN 13   CREATININE 1.02   CALCIUM 8.3*     CBC:   Recent Labs   Lab 11/30/22  1106   WBC 7.10   HGB 14.3   HCT 44.2          Significant Imaging: I have reviewed all pertinent imaging results/findings within the past 24 hours.

## 2022-11-30 NOTE — PLAN OF CARE
[facility]  Initial Discharge Assessment       Primary Care Provider: Primary Doctor No    Admission Diagnosis: Primary osteoarthritis of left knee [M17.12]  DJD (degenerative joint disease) of knee [M17.9]    Admission Date: 11/30/2022  Expected Discharge Date: 12/1/2022    Discharge Barriers Identified: None    Payor: BLUE CROSS [facility] / Plan: [facility] / Product Type: Commercial /     Extended Emergency Contact Information  Primary Emergency Contact: ELLY CARRILLO  Mobile Phone: [phone]  Relation: Spouse  Preferred language: English   needed? No    Discharge Plan A: Home Health  Discharge Plan B: Home Health      [id number] - [place], [place] - [address] [facility]  [address]  [facility] [address]  Phone: [phone] Fax: [phone]    [facility] [place], [place] - [address]  [address]  [facility] [address]  Phone: [phone] Fax: [phone]      Initial Assessment (most recent)       Adult Discharge Assessment - 11/30/22 1203          Discharge Assessment    Assessment Type Discharge Planning Assessment     Source of Information patient;family     Communicated YARED with patient/caregiver Yes     Lives With spouse     Do you expect to return to your current living situation? Yes     Do you have help at home or someone to help you manage your care at home? Yes     Who are your caregiver(s) and their phone number(s)? wife Elly [phone]     Prior to hospitilization cognitive status: Alert/Oriented     Current cognitive status: Alert/Oriented     Walking or Climbing Stairs Difficulty none     Dressing/Bathing Difficulty none     Equipment Currently Used at Home none     Patient currently being followed by outpatient case management? No     Do you currently have service(s) that help you manage your care at home? No     Who is going to help you get home at discharge? wife     How  do you get to doctors appointments? car, drives self;family or friend will provide     Are you on dialysis? No     Do you take coumadin? No     Discharge Plan A Home Health     Discharge Plan B Home Health     DME Needed Upon Discharge  bedside commode   patient wants crutches but may need rolling walker. he wants to wait on therapy to decide.    Discharge Plan discussed with: Spouse/sig other;Patient     Discharge Barriers Identified None                 Spoke with patient in the room he lives with his wife. She is at bedside. Elective ortho surgery today. Patient was independent with care at home, worked, and drove. Patient wants home health with [facility]. Signed choice. Faxed and notified Denise. Patient will need bedside commode. Has no equipment at home. He wants to wait until therapy as he wants crutches instead of rolling walker. Discharge plan is home with wife. They are in a transition of moving.  New address for home health will be [address] [place] [address].  Plan is for [place] tomorrow. Patient aware.

## 2022-11-30 NOTE — ANESTHESIA PROCEDURE NOTES
Intubation    Date/Time: 11/30/2022 7:38 AM  Performed by: Anuj Carrillo CRNA  Authorized by: Stan Sylvester MD     Intubation:     Induction:  Intravenous    Mask Ventilation:  Easy mask    Difficult Airway Encountered?: No      Complications:  None    Airway Device:  Supraglottic airway/LMA    Airway Device Size:  4.0    Placement Verified By:  Capnometry    Complicating Factors:  None    Findings Post-Intubation:  BS equal bilateral and atraumatic/condition of teeth unchanged

## 2022-11-30 NOTE — CONSULTS
[facility]  [facility]  Consult Note    Patient Name: Mukesh Loera  MRN: [id number]  Admission Date: 11/30/2022  Hospital Length of Stay: 0 days  Attending Physician: Damir Blakely III, MD   Primary Care Provider: Primary Doctor No           Patient information was obtained from patient, past medical records and ER records.     Consults  Subjective:     Principal Problem: S/P total knee arthroplasty, left    Chief Complaint: No chief complaint on file.       HPI: PT is a 57 year old male who presents today for Left Total Knee Arthroplasty performed by Dr. Reddy MD. Patient states he has been having left knee pain for the past 3 years. Patient states the pain has progressively gotten worse and he decided to see his PCP the beginning of this year and was treated with steroids and NSAID's.  PT's pain increased after treatment and affected his activities of daily living and he was referred to Dr. Blakely. Patient was prescribed Voltaren gel and Meloxicam, his pain improved for a few months. Patients pain increased and he decided it was time to get the surgery. Pt denies HA, Fever, Fatigue, CP or shortness of breath.     PT has a history of HTN, HLD and osteoarthritis of Left Knee. 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 Patient lives at home with his wife who plans to help him with therapy.    The patient was admitted to [facility] was consulted for medical management of this patient. Thank you Dr. Blakely for the consult.                 No new subjective & objective note has been filed under this hospital service since the last note was generated.    Assessment/Plan:     * S/P total knee arthroplasty, left  PT/OT evaluate and Treat  Foot Compression Pump BLE  Incentive Spirometry  Neurovascular checks Q4hrs  Hooper 5-325  Q4hr  Aspirin 325 mg  Morphine 4mg          Essential hypertension  Continue Home Norvasc  CBC, BMP in am      Dyslipidemia   Continue Lipitor 20 mg  Lipid Profile  Hemoglobin A1C        VTE Risk Mitigation (From admission, onward)         Ordered     IP VTE HIGH RISK PATIENT  Once         11/30/22 1003     Place DANY hose  Until discontinued         11/30/22 1003                    Thank you for your consult. I will follow-up with patient. Please contact us if you have any additional questions.    Abdelrahman Winslow MD  Department of Hospital Medicine   [facility]

## 2022-11-30 NOTE — HPI
PT is a 57 year old male who presents today for Left Total Knee Arthroplasty performed by Dr. Reddy MD. Patient states he has been having left knee pain for the past 3 years. Patient states the pain has progressively gotten worse and he decided to see his PCP the beginning of this year and was treated with steroids and NSAID's.  PT's pain increased after treatment and affected his activities of daily living and he was referred to Dr. Blakely. Patient was prescribed Voltaren gel and Meloxicam, his pain improved for a few months. Patients pain increased and he decided it was time to get the surgery. Pt denies HA, Fever, Fatigue, CP or shortness of breath.     PT has a history of HTN, HLD and osteoarthritis of Left Knee. PT works for an [employer] company and he says he works on his feet 13 hours a day for the past 23 years but the pain had become unbearable prior to surgery. Patient lives at home with his wife who plans to help him with therapy.    The patient was admitted to [facility] was consulted for medical management of this patient. Thank you Dr. Blakely for the consult.

## 2022-11-30 NOTE — ANESTHESIA POSTPROCEDURE EVALUATION
Anesthesia Post Evaluation    Patient: Mukesh Loera    Procedure(s) Performed: Procedure(s) (LRB):  ARTHROPLASTY, KNEE, TOTAL, USING COMPUTER-ASSISTED NAVIGATION (Left)    Final Anesthesia Type: general      Patient location during evaluation: PACU  Patient participation: Yes- Able to Participate  Level of consciousness: awake and sedated  Post-procedure vital signs: reviewed and stable  Pain management: adequate  Airway patency: patent    PONV status at discharge: No PONV  Anesthetic complications: no      Cardiovascular status: blood pressure returned to baseline  Respiratory status: unassisted  Hydration status: euvolemic  Follow-up not needed.          Vitals Value Taken Time   /82 11/30/22 1047   Temp 37.6 °C (99.7 °F) 11/30/22 1024   Pulse 94 11/30/22 1049   Resp 17 11/30/22 1049   SpO2 95 % 11/30/22 1049   Vitals shown include unvalidated device data.      No case tracking events are documented in the log.      Pain/Stefan Score: Stefan Score: 10 (11/30/2022 10:45 AM)        
98

## 2022-11-30 NOTE — ANESTHESIA PROCEDURE NOTES
Peripheral Block    Patient location during procedure: OR   Block not for primary anesthetic.  Reason for block: at surgeon's request and post-op pain management   Post-op Pain Location: lt tka   Start time: 11/30/2022 7:35 AM  Timeout: 11/30/2022 7:34 AM   End time: 11/30/2022 4:50 AM    Staffing  Authorizing Provider: Stan Sylvester MD  Performing Provider: Stan Sylvester MD    Preanesthetic Checklist  Completed: patient identified, IV checked, site marked, risks and benefits discussed, surgical consent, monitors and equipment checked, pre-op evaluation and timeout performed  Peripheral Block  Patient position: supine  Prep: ChloraPrep  Patient monitoring: heart rate, cardiac monitor, continuous pulse ox, continuous capnometry and frequent blood pressure checks  Block type: adductor canal  Laterality: left  Injection technique: single shot  Needle  Needle type: Stimuplex   Needle gauge: 20 G  Needle length: 4 in  Needle localization: ultrasound guidance   -ultrasound image captured on disc.  Assessment  Injection assessment: negative aspiration, negative parasthesia and local visualized surrounding nerve  Paresthesia pain: none  Heart rate change: no  Slow fractionated injection: yes  Pain Tolerance: comfortable throughout block and no complaints  Medications:    Medications: ROPIvacaine (NAROPIN) injection 0.5% - Perineural   40 mL - 11/30/2022 7:57:00 AM

## 2022-11-30 NOTE — PLAN OF CARE
Problem: Pain Acute  Goal: Acceptable Pain Control and Functional Ability  Outcome: Ongoing, Progressing  Intervention: Develop Pain Management Plan  Flowsheets (Taken 11/30/2022 1743)  Pain Management Interventions:   relaxation techniques promoted   quiet environment facilitated   position adjusted   care clustered   cold applied  Intervention: Prevent or Manage Pain  Flowsheets (Taken 11/30/2022 1743)  Sleep/Rest Enhancement: relaxation techniques promoted  Sensory Stimulation Regulation:   quiet environment promoted   care clustered  Medication Review/Management: medications reviewed  Intervention: Optimize Psychosocial Wellbeing  Flowsheets (Taken 11/30/2022 1743)  Supportive Measures:   relaxation techniques promoted   verbalization of feelings encouraged   decision-making supported   Reviewed care plans with pt. Will continue to monitor.

## 2022-11-30 NOTE — PROGRESS NOTES
1019 REC'ED TO RR WITH ORAL AIRWAY IN PLACE. COLOR PINK. NO RESP. DISTRESS NOTED. O2 VIA FM. DRESSING LEFT KNEE/LEG D/I. ICE WRAP AND IMMOBILIZER LEFT KNEE LEFT PEDAL PULSE +. HEMOVAC DRAIN LEFT KNEE COMPRESSED, P/D RED COLORED DRAINAGE. FOOT PUMPS IN PROGRESS. ANN CATH P/D YELLOW URINE. IV INFUSING LEFT HAND 20G. CATH. OBSERVING CLOSELY. SEE FLOW SHEET.    1040 ORAL AIRWAY REMOVED, ORIENTATION GIVEN. FOLLOWS COMMANDS, SEAN. SPINAL LEVEL APPROX L-2. X-RAYS DON E PER TECHS, RANDY. WELL.        1105 AWAKE, ALERT. BLOOD DRAWN PER . NO C/O PAIN. TRANSFERRED TO ROOM.

## 2022-11-30 NOTE — ANESTHESIA PREPROCEDURE EVALUATION
11/30/2022  Mukesh Loera is a 57 y.o., male.      Pre-op Assessment    I have reviewed the Patient Summary Reports.     I have reviewed the Nursing Notes. I have reviewed the NPO Status.   I have reviewed the Medications.     Review of Systems  Anesthesia Hx:  No problems with previous Anesthesia    Social:  Non-Smoker, No Alcohol Use    Hematology/Oncology:  Hematology Normal   Oncology Normal     EENT/Dental:EENT/Dental Normal   Cardiovascular:   Hypertension hyperlipidemia    Pulmonary:  Pulmonary Normal    Renal/:  Renal/ Normal     Hepatic/GI:  Hepatic/GI Normal    Musculoskeletal:   Arthritis     Neurological:  Neurology Normal    Endocrine:  Endocrine Normal    Dermatological:  Skin Normal    Psych:  Psychiatric Normal           Physical Exam  General: Well nourished    Airway:  Mallampati: II / II  Mouth Opening: Normal  TM Distance: > 6 cm  Tongue: Normal  Neck ROM: Normal ROM    Chest/Lungs:  Clear to auscultation, Normal Respiratory Rate    Heart:  Rate: Normal  Rhythm: Regular Rhythm        Anesthesia Plan  Type of Anesthesia, risks & benefits discussed:    Anesthesia Type: Gen Supraglottic Airway, Spinal, Regional  Intra-op Monitoring Plan: Standard ASA Monitors  Post Op Pain Control Plan: multimodal analgesia  Induction:  IV  Informed Consent: Informed consent signed with the Patient and all parties understand the risks and agree with anesthesia plan.  All questions answered.   ASA Score: 2  Day of Surgery Review of History & Physical: H&P Update referred to the surgeon/provider.I have interviewed and examined the patient. I have reviewed the patient's H&P dated: There are no significant changes. H&P completed by Anesthesiologist.    Ready For Surgery From Anesthesia Perspective.     .

## 2022-11-30 NOTE — CONSULTS
[facility]  [facility] Medicine  Consult Note    Patient Name: Mukesh Loera  MRN: [id number]  Admission Date: 11/30/2022  Hospital Length of Stay: 0 days  Attending Physician: Damir Blakely III, MD   Primary Care Provider: Primary Doctor No           Patient information was obtained from patient and primary team.     Consults  Subjective:     Principal Problem: S/P total knee arthroplasty, left    Chief Complaint: No chief complaint on file.       HPI: PT is a 57 year old male who presents today for Left Total Knee Arthroplasty performed by Dr. Reddy MD. Patient states he has been having left knee pain for the past 3 years. Patient states the pain has progressively gotten worse and he decided to see his PCP the beginning of this year and was treated with steroids and NSAID's.  PT's pain increased after treatment and affected his activities of daily living and he was referred to Dr. Blakely. Patient was prescribed Voltaren gel and Meloxicam, his pain improved for a few months. Patients pain increased and he decided it was time to get the surgery. Pt denies HA, Fever, Fatigue, CP or shortness of breath.     PT has a history of HTN, HLD and osteoarthritis of Left Knee. 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 Patient lives at home with his wife who plans to help him with therapy.    The patient was admitted to [facility] was consulted for medical management of this patient. Thank you Dr. Blakely for the consult.                 Past Medical History:   Diagnosis Date    Hypertension        History reviewed. No pertinent surgical history.    Review of patient's allergies indicates:  No Known Allergies    No current facility-administered medications on file prior to encounter.     Current Outpatient Medications on File Prior to Encounter    Medication Sig    amLODIPine (NORVASC) 10 MG tablet Take 1 tablet (10 mg total) by mouth once daily.    atorvastatin (LIPITOR) 20 MG tablet Take 1 tablet (20 mg total) by mouth once daily.    meloxicam (MOBIC) 7.5 MG tablet TAKE 1 TABLET BY MOUTH EVERY DAY     Family History       Problem Relation (Age of Onset)    Hypertension Mother          Tobacco Use    Smoking status: Never    Smokeless tobacco: Never   Substance and Sexual Activity    Alcohol use: Yes    Drug use: Not Currently     Types: Marijuana    Sexual activity: Never     Review of Systems   Constitutional: Negative.    HENT: Negative.     Eyes: Negative.    Respiratory: Negative.     Cardiovascular: Negative.    Gastrointestinal: Negative.    Endocrine: Negative.    Genitourinary: Negative.    Musculoskeletal:  Positive for arthralgias.   Allergic/Immunologic: Negative.    Neurological: Negative.    Hematological: Negative.    Psychiatric/Behavioral: Negative.     Objective:     Vital Signs (Most Recent):  Temp: 97.8 °F (36.6 °C) (11/30/22 1230)  Pulse: 74 (11/30/22 1230)  Resp: 17 (11/30/22 1304)  BP: (!) 140/88 (11/30/22 1230)  SpO2: 97 % (11/30/22 1230)   Vital Signs (24h Range):  Temp:  [97.8 °F (36.6 °C)-99.7 °F (37.6 °C)] 97.8 °F (36.6 °C)  Pulse:  [] 74  Resp:  [0-21] 17  SpO2:  [91 %-99 %] 97 %  BP: (117-140)/(71-92) 140/88     Weight: 115.7 kg (255 lb)  Body mass index is 31.87 kg/m².    Physical Exam  Constitutional:       Appearance: He is obese.   HENT:      Head: Normocephalic.      Right Ear: Tympanic membrane normal.      Left Ear: Tympanic membrane normal.      Nose: Nose normal.      Mouth/Throat:      Mouth: Mucous membranes are moist.      Pharynx: Oropharynx is clear.   Eyes:      Conjunctiva/sclera: Conjunctivae normal.      Pupils: Pupils are equal, round, and reactive to light.   Cardiovascular:      Rate and Rhythm: Normal rate and regular rhythm.      Pulses: Normal pulses.      Heart sounds: Normal heart  sounds.   Pulmonary:      Effort: Pulmonary effort is normal.      Breath sounds: Normal breath sounds.   Abdominal:      General: Abdomen is flat. Bowel sounds are normal.      Palpations: Abdomen is soft.   Musculoskeletal:         General: Tenderness present. Normal range of motion.      Cervical back: Normal range of motion.      Comments: LLE   Skin:     General: Skin is warm and dry.      Capillary Refill: Capillary refill takes less than 2 seconds.   Neurological:      Mental Status: He is alert and oriented to person, place, and time.   Psychiatric:         Mood and Affect: Mood normal.       Significant Labs: All pertinent labs within the past 24 hours have been reviewed.  BMP:   Recent Labs   Lab 11/30/22  1106   *      K 4.1      CO2 25   BUN 13   CREATININE 1.02   CALCIUM 8.3*     CBC:   Recent Labs   Lab 11/30/22  1106   WBC 7.10   HGB 14.3   HCT 44.2          Significant Imaging: I have reviewed all pertinent imaging results/findings within the past 24 hours.    Assessment/Plan:     * S/P total knee arthroplasty, left  PT/OT evaluate and Treat  Foot Compression Pump BLE  Incentive Spirometry  Neurovascular checks Q4hrs  Norco 5-325 Q4hr  Aspirin 325 mg  Morphine 4mg          Essential hypertension  Continue Home Norvasc  CBC, BMP in am      Dyslipidemia   Continue Lipitor 20 mg  Lipid Profile  Hemoglobin A1C        VTE Risk Mitigation (From admission, onward)         Ordered     IP VTE HIGH RISK PATIENT  Once         11/30/22 1003     Place DANY hose  Until discontinued         11/30/22 1003                    Thank you for your consult. I will follow-up with patient. Please contact us if you have any additional questions.    Abdelrahman Winslow MD  Department of Hospital Medicine   [facility]

## 2022-11-30 NOTE — PT/OT/SLP EVAL
Physical Therapy Evaluation    Patient Name:  Mukesh Loera   MRN:  [id number]    Recommendations:     Discharge Recommendations:  home health PT, home with home health   Discharge Equipment Recommendations: 3-in-1 commode, crutches   Barriers to discharge: None    Assessment:     Mukesh Loera is a 57 y.o. male admitted with a medical diagnosis of S/P total knee arthroplasty, left.  He presents with the following impairments/functional limitations:    Patient with good pain relief. Able to sit and stand without difficulty. Not able to take steps due to proprioception not yet returned from spinal. Will start rom and increase gait tomorrow. Plan is for home..    Rehab Prognosis: Good; patient would benefit from acute skilled PT services to address these deficits and reach maximum level of function.    Recent Surgery: Procedure(s) (LRB):  ARTHROPLASTY, KNEE, TOTAL, USING COMPUTER-ASSISTED NAVIGATION (Left) Day of Surgery    Plan:     During this hospitalization, patient to be seen   to address the identified rehab impairments via gait training, therapeutic activities and progress toward the following goals:    Plan of Care Expires:  12/30/22    Subjective     Chief Complaint: Post op pain, cant control legs with walkign  Patient/Family Comments/goals: Patient states he cant feel his BLE fully in standing  Pain/Comfort:  Pain Rating 1: 4/10  Location - Side 1: Left  Location 1: knee  Pain Addressed 1: Cessation of Activity    Patients cultural, spiritual, Latter-day conflicts given the current situation: no    Living Environment:  Lives with spouse  Prior to admission, patients level of function was independent.  Equipment used at home: none.  DME owned (not currently used): none.  Upon discharge, patient will have assistance from spouse.    Objective:     Communicated with nurse prior to session.  Patient found supine with    upon PT entry to room.    General Precautions: Standard,     Orthopedic  Precautions:LLE weight bearing as tolerated   Braces: Knee immobilizer  Respiratory Status: Room air    Exams:  Cognitive Exam:  Patient is oriented to Person, Place, Time, and Situation  Sensation:    -       Impaired  proprioception bilateral  RLE ROM: WNL  RLE Strength: WNL  LLE ROM: knee immobilized  LLE Strength: 3/5    Functional Mobility:  Bed Mobility:     Supine to Sit: minimum assistance  Sit to Supine: minimum assistance  Transfers:     Sit to Stand:  minimum assistance with rolling walker  Gait:   unable due to decreased proprioception      AM-PAC 6 CLICK MOBILITY  Total Score:18       Treatment & Education:  Tx plan    Patient left supine with call button in reach.    GOALS:   Multidisciplinary Problems       Physical Therapy Goals          Problem: Physical Therapy    Goal Priority Disciplines Outcome Goal Variances Interventions   Physical Therapy Goal     PT, PT/OT Ongoing, Progressing     Description: Short Term Goals  Independent with HEP  Independent with crutchesx 200 feet FWB/WBAT: left lower extremity    Long term goals  Needed equipment for home.                            History:     Past Medical History:   Diagnosis Date    Hypertension        History reviewed. No pertinent surgical history.    Time Tracking:     PT Received On: 11/30/22  PT Start Time: 1430     PT Stop Time: 1455  PT Total Time (min): 25 min     Billable Minutes: Evaluation 25      11/30/2022

## 2022-11-30 NOTE — ASSESSMENT & PLAN NOTE
PT/OT evaluate and Treat  Foot Compression Pump BLE  Incentive Spirometry  Neurovascular checks Q4hrs  Norco 5-325 Q4hr  Morphine 4mg

## 2022-11-30 NOTE — ASSESSMENT & PLAN NOTE
PT/OT evaluate and Treat  Foot Compression Pump BLE  Incentive Spirometry  Neurovascular checks Q4hrs  Norco 5-325 Q4hr  Aspirin 325 mg  Morphine 4mg

## 2022-11-30 NOTE — TRANSFER OF CARE
"Anesthesia Transfer of Care Note    Patient: Mukesh Loera    Procedure(s) Performed: Procedure(s) (LRB):  ARTHROPLASTY, KNEE, TOTAL, USING COMPUTER-ASSISTED NAVIGATION (Left)    Patient location: PACU    Anesthesia Type: general, regional and spinal    Transport from OR: Transported from OR on room air with adequate spontaneous ventilation    Post pain: adequate analgesia    Post assessment: no apparent anesthetic complications    Post vital signs: stable    Level of consciousness: responds to stimulation    Nausea/Vomiting: no nausea/vomiting    Complications: none    Transfer of care protocol was followed      Last vitals:   Visit Vitals  /71   Pulse 79   Temp 37.6 °C (99.7 °F)   Resp 14   Ht 6' 3" (1.905 m)   Wt 115.7 kg (255 lb)   SpO2 97%   BMI 31.87 kg/m²     "

## 2022-11-30 NOTE — ANESTHESIA PROCEDURE NOTES
Spinal    Diagnosis: lt tka  Patient location during procedure: OR  Start time: 11/30/2022 7:27 AM  Timeout: 11/30/2022 7:26 AM  End time: 11/30/2022 7:32 AM    Staffing  Authorizing Provider: Stan Sylvester MD  Performing Provider: Stan Sylvester MD    Preanesthetic Checklist  Completed: patient identified, IV checked, risks and benefits discussed, surgical consent, monitors and equipment checked, pre-op evaluation and timeout performed  Spinal Block  Patient position: sitting  Prep: Betadine  Patient monitoring: heart rate, continuous pulse ox, continuous capnometry and frequent blood pressure checks  Approach: midline  Location: L3-4  Injection technique: single shot  CSF Fluid: clear free-flowing CSF  Needle  Needle type: Quincke   Needle gauge: 22 G  Needle length: 4 in  Needle localization: anatomical landmarks  Assessment  Sensory level: T8   Dermatomal levels determined by alcohol wipe  Ease of block: easy  Patient's tolerance of the procedure: comfortable throughout block and no complaints  Medications:    Medications: BUPivacaine (pf) (MARCAINE) injection 0.75% - Intraspinal   1.5 mL - 11/30/2022 7:56:00 AM

## 2022-11-30 NOTE — OP NOTE
Department of Orthopedic Surgery    Operative Note  NAME: Mukesh Loera    : 1965    SURGERY DATE: 2022     PREOPERATIVE DIAGNOSIS: Primary osteoarthritis of left knee [M17.12]    POSTOPERATIVE DIAGNOSIS:  Primary osteoarthritis of left knee computer navigated [M17.12]  8 fem  10 tib  41 patella    PROCEDURE: left Total Knee Arthroplasty computer navigated        SURGEON: Damir Blakely III, MD    ASSISTANT:  Vishnu      ANESTHESIA:  General spinal adductor canal block    BLOOD LOSS:  50 cc     TOURNIQUET TIME:  See record    DRAINS: Hemovac drain x2    IMPLANT:  Implant Name Type Inv. Item Serial No.  Lot No. LRB No. Used Action   CEMENT BONE SURG SMPLX P RADPQ - [id number]  CEMENT BONE SURG SMPLX P RADPQ  [employer] [id number] Left 1 Implanted   CEMENT BONE SURG SMPLX P RADPQ - [id number]  CEMENT BONE SURG SMPLX P RADPQ  [employer] [id number] Left 1 Implanted   SYS KNEE EPAK PIN ATTUNE - [id number]  SYS KNEE EPAK PIN ATTUNE  [employer] [id number] Left 1 Implanted and Explanted   BASE ATTUNE TIB FB TRAY SZ 10 - [id number]  BASE ATTUNE TIB FB TRAY SZ 10  [employer] [facility] [id number] Left 1 Implanted   DEPUY ATTUNE FEMORAL POSTERIOR SIZE 8 LEFT CEMENTED     [id number] Left 1 Implanted   PATELLA ATTUNE MEDLZD DOME 41 - [id number]  PATELLA ATTUNE MEDLZD DOME 41  [employer] [id number] Left 1 Implanted   DEPUY ATTUNE TIBIAL INSERT FIXED BEARING POSTERIOR SIZE 8 / 10MM     [id number] Left 1 Implanted         INDICATIONS FOR PROCEDURE:   [unfilled] is a 57 y.o.-year-old with debilitating left-sided knee pain despite nonoperative measures and interested in knee arthroplasty.    PROCEDURE IN DETAIL:  After obtaining informed consent and starting the patient on preoperative IV antibiotics, the patient was taken back to the Operating   Room. Anesthesia was performed by Anesthesia Team. The left lower  extremity was prepped and draped in normal sterile fashion.  An Esmarch bandage was used  to exsanguinate the affected lower extremity and the pneumatic tourniquet  was inflated to 300 mmHg.  A standard longitudinal midline incision was made beginning 3 fingerbreadths above the superior pole of the patella extending down to the tibial tubercle.  Full-thickness skin flaps were raised.  A medial parapatellar arthrotomy was made. The patella was then everted.    A release of the proximal medial tibia and MCL was then undertaken.  The infrapatella fat pad was resected. Two pins were placed in the femur and two pins in the tibia for computer navigation.  The leg was placed through range of motion to get the overall alignment of the femoral head.  Landmarks on the femur and the tibia were then marked.  Using computer navigation 9 mm were resected from the femur with 5 degrees of valgus.  The cut was verified using the computer array.  The size of the femoral prosthesis was re-verified mechanically in addition to the computer verification system.  Chamfer cuts were then made with 3 degrees of external rotation for a size 8   femoral component.  The notch cuts for posterior stabilization were then made.  A trial reduction was performed using the femoral trials.  Attention was then turned to the tibial side.  Once again using computer navigation, a resection of approximately 9 mm was performed with approximately a 3-degree posterior slope.  The mechanical trials were used at this point to measure the appropriate tibial baseplate, which was 10.  The outrigger jelani was then used for rotational verification.  Once the rotation was confirmed with the outrigger jelani, the tibial plateau was drilled then impacted for the stem.  With the trial tibial baseplate and the femoral component in place, the poly was trialed for overall extension, flexion, and balancing.  The best fit poly was 10.  Attention was then turned to the patella which with the guide set at 9 mm, the cut was made.  It was mechanically sized to a size 41  and the three PEG holes drilled.  A repeat trial reduction once again was performed, verifying the patellar stability, obtaining full extension without hyperextension and good flexion.  The knee was then injected with 10 ccs of Marcaine and 10 ccs of Duramorph in the synovial tissue.  Hemostasis was also obtained being careful to check posterior, medial and lateral.  The knee was copiously irrigated out.  At this time, two bags of surgical Simplex P cement were mixed with 1.2 of tobramycin using third generation vacuum .  Once the cement was thick,  a cement gun was used to inject the tibia and the stem on the baseplate.  Then the tibial baseplate was impacted in place.  Excess cement was removed.  The femoral component was then impacted with extra cement removed.  Trial poly was placed. The leg was placed in full extension with pressure on the leg to maintain extension.  Any excess cement was once again removed at this point.  The patella was then cemented in place and clamped.  Once all the cement had hardened, the trial poly was removed and any excess cement was removed, being careful to check under the medial and lateral femoral condyles for any excess cement.  The knee was copiously irrigated out.  The tourniquet was then released and hemostasis was obtained.  The knee was irrigated out and a posterior stabilized polyethelene was impacted onto the tibial baseplate. The knee was once again placed through range of motion, verifying full extension, stability to varus and valgus stressing and flexion.  Hemostasis once again obtained using Bovie.  The knee was irrigated out, drains were placed x 2 using medium Hemovac.  The capsule was closed with #2 Ti-Cron.  Then 0 and 2-0 Vicryl were used to close the subcutaneous tissue.  The skin was closed with staples.  A silver dressing was applied to the incision with a bulky sterile dressing around the knee and a compressive dressing from the foot to the thigh.   A cryo-cuff and a knee immobilizer were applied over the dressing.  The patient tolerated the procedure well without complications.           Damir Blakely III, MD

## 2022-12-01 VITALS
RESPIRATION RATE: 17 BRPM | WEIGHT: 255 LBS | TEMPERATURE: 99 F | DIASTOLIC BLOOD PRESSURE: 75 MMHG | BODY MASS INDEX: 31.71 KG/M2 | HEIGHT: 75 IN | HEART RATE: 79 BPM | OXYGEN SATURATION: 96 % | SYSTOLIC BLOOD PRESSURE: 102 MMHG

## 2022-12-01 LAB
ALBUMIN SERPL BCP-MCNC: 3.5 G/DL (ref 3.5–5)
ALBUMIN/GLOB SERPL: 1 {RATIO}
ALP SERPL-CCNC: 69 U/L (ref 45–115)
ALT SERPL W P-5'-P-CCNC: 21 U/L (ref 16–61)
ANION GAP SERPL CALCULATED.3IONS-SCNC: 16 MMOL/L (ref 7–16)
AST SERPL W P-5'-P-CCNC: 15 U/L (ref 15–37)
BASOPHILS # BLD AUTO: 0.01 K/UL (ref 0–0.2)
BASOPHILS NFR BLD AUTO: 0.1 % (ref 0–1)
BILIRUB SERPL-MCNC: 0.7 MG/DL (ref ?–1.2)
BUN SERPL-MCNC: 14 MG/DL (ref 7–18)
BUN/CREAT SERPL: 13 (ref 6–20)
CALCIUM SERPL-MCNC: 8.4 MG/DL (ref 8.5–10.1)
CHLORIDE SERPL-SCNC: 99 MMOL/L (ref 98–107)
CHOLEST SERPL-MCNC: 169 MG/DL (ref 0–200)
CHOLEST/HDLC SERPL: 3 {RATIO}
CO2 SERPL-SCNC: 24 MMOL/L (ref 21–32)
CREAT SERPL-MCNC: 1.05 MG/DL (ref 0.7–1.3)
DIFFERENTIAL METHOD BLD: ABNORMAL
EGFR (NO RACE VARIABLE) (RUSH/TITUS): 82 ML/MIN/1.73M²
EOSINOPHIL # BLD AUTO: 0 K/UL (ref 0–0.5)
EOSINOPHIL NFR BLD AUTO: 0 % (ref 1–4)
ERYTHROCYTE [DISTWIDTH] IN BLOOD BY AUTOMATED COUNT: 14 % (ref 11.5–14.5)
EST. AVERAGE GLUCOSE BLD GHB EST-MCNC: 114 MG/DL
GLOBULIN SER-MCNC: 3.6 G/DL (ref 2–4)
GLUCOSE SERPL-MCNC: 123 MG/DL (ref 74–106)
HBA1C MFR BLD HPLC: 6 % (ref 4.5–6.6)
HCT VFR BLD AUTO: 41.3 % (ref 40–54)
HDLC SERPL-MCNC: 56 MG/DL (ref 40–60)
HGB BLD-MCNC: 13.4 G/DL (ref 13.5–18)
IMM GRANULOCYTES # BLD AUTO: 0.05 K/UL (ref 0–0.04)
IMM GRANULOCYTES NFR BLD: 0.4 % (ref 0–0.4)
LDLC SERPL CALC-MCNC: 100 MG/DL
LDLC/HDLC SERPL: 1.8 {RATIO}
LYMPHOCYTES # BLD AUTO: 1.06 K/UL (ref 1–4.8)
LYMPHOCYTES NFR BLD AUTO: 8.3 % (ref 27–41)
MCH RBC QN AUTO: 29.9 PG (ref 27–31)
MCHC RBC AUTO-ENTMCNC: 32.4 G/DL (ref 32–36)
MCV RBC AUTO: 92.2 FL (ref 80–96)
MONOCYTES # BLD AUTO: 0.81 K/UL (ref 0–0.8)
MONOCYTES NFR BLD AUTO: 6.3 % (ref 2–6)
MPC BLD CALC-MCNC: 10.9 FL (ref 9.4–12.4)
NEUTROPHILS # BLD AUTO: 10.87 K/UL (ref 1.8–7.7)
NEUTROPHILS NFR BLD AUTO: 84.9 % (ref 53–65)
NONHDLC SERPL-MCNC: 113 MG/DL
NRBC # BLD AUTO: 0 X10E3/UL
NRBC, AUTO (.00): 0 %
PLATELET # BLD AUTO: 249 K/UL (ref 150–400)
POTASSIUM SERPL-SCNC: 4.2 MMOL/L (ref 3.5–5.1)
PROT SERPL-MCNC: 7.1 G/DL (ref 6.4–8.2)
RBC # BLD AUTO: 4.48 M/UL (ref 4.6–6.2)
SODIUM SERPL-SCNC: 135 MMOL/L (ref 136–145)
TRIGL SERPL-MCNC: 67 MG/DL (ref 35–150)
TSH SERPL DL<=0.005 MIU/L-ACNC: 0.3 UIU/ML (ref 0.36–3.74)
VLDLC SERPL-MCNC: 13 MG/DL
WBC # BLD AUTO: 12.8 K/UL (ref 4.5–11)

## 2022-12-01 PROCEDURE — 85025 COMPLETE CBC W/AUTO DIFF WBC: CPT | Performed by: ORTHOPAEDIC SURGERY

## 2022-12-01 PROCEDURE — 36415 COLL VENOUS BLD VENIPUNCTURE: CPT | Performed by: ORTHOPAEDIC SURGERY

## 2022-12-01 PROCEDURE — 25000003 PHARM REV CODE 250

## 2022-12-01 PROCEDURE — 97535 SELF CARE MNGMENT TRAINING: CPT

## 2022-12-01 PROCEDURE — 99214 PR OFFICE/OUTPT VISIT, EST, LEVL IV, 30-39 MIN: ICD-10-PCS | Mod: ,,, | Performed by: INTERNAL MEDICINE

## 2022-12-01 PROCEDURE — 97116 GAIT TRAINING THERAPY: CPT

## 2022-12-01 PROCEDURE — 80053 COMPREHEN METABOLIC PANEL: CPT | Performed by: ORTHOPAEDIC SURGERY

## 2022-12-01 PROCEDURE — 25000003 PHARM REV CODE 250: Performed by: ORTHOPAEDIC SURGERY

## 2022-12-01 PROCEDURE — 97110 THERAPEUTIC EXERCISES: CPT

## 2022-12-01 PROCEDURE — 99214 OFFICE O/P EST MOD 30 MIN: CPT | Mod: ,,, | Performed by: INTERNAL MEDICINE

## 2022-12-01 PROCEDURE — 84443 ASSAY THYROID STIM HORMONE: CPT

## 2022-12-01 PROCEDURE — 83036 HEMOGLOBIN GLYCOSYLATED A1C: CPT

## 2022-12-01 PROCEDURE — 80061 LIPID PANEL: CPT

## 2022-12-01 PROCEDURE — 94761 N-INVAS EAR/PLS OXIMETRY MLT: CPT

## 2022-12-01 RX ADMIN — HYDROCODONE BITARTRATE AND ACETAMINOPHEN 1 TABLET: 5; 325 TABLET ORAL at 07:12

## 2022-12-01 RX ADMIN — ASPIRIN 325 MG: 325 TABLET, COATED ORAL at 07:12

## 2022-12-01 RX ADMIN — THERA TABS 1 TABLET: TAB at 07:12

## 2022-12-01 RX ADMIN — ATORVASTATIN CALCIUM 20 MG: 20 TABLET, FILM COATED ORAL at 07:12

## 2022-12-01 NOTE — SUBJECTIVE & OBJECTIVE
Interval History: Patient AAOX3. Patient states his knee is feeling better this morning after some pain medication. No distress noted.     Review of Systems   Constitutional: Negative.    HENT: Negative.     Eyes: Negative.    Respiratory: Negative.     Cardiovascular: Negative.    Gastrointestinal: Negative.    Endocrine: Negative.    Genitourinary: Negative.    Musculoskeletal:  Positive for arthralgias.   Allergic/Immunologic: Negative.    Neurological: Negative.    Hematological: Negative.    Psychiatric/Behavioral: Negative.     Objective:     Vital Signs (Most Recent):  Temp: 98.5 °F (36.9 °C) (12/01/22 0730)  Pulse: 79 (12/01/22 0730)  Resp: 17 (12/01/22 0753)  BP: 102/75 (12/01/22 0730)  SpO2: 96 % (12/01/22 0730) Vital Signs (24h Range):  Temp:  [97.7 °F (36.5 °C)-99.7 °F (37.6 °C)] 98.5 °F (36.9 °C)  Pulse:  [] 79  Resp:  [0-21] 17  SpO2:  [91 %-100 %] 96 %  BP: (102-150)/(62-99) 102/75     Weight: 115.7 kg (255 lb)  Body mass index is 31.87 kg/m².    Intake/Output Summary (Last 24 hours) at 12/1/2022 0810  Last data filed at 12/1/2022 0522  Gross per 24 hour   Intake 1350 ml   Output 3640 ml   Net -2290 ml      Physical Exam  Constitutional:       Appearance: He is obese.   HENT:      Head: Normocephalic.      Right Ear: Tympanic membrane normal.      Left Ear: Tympanic membrane normal.      Nose: Nose normal.      Mouth/Throat:      Mouth: Mucous membranes are moist.      Pharynx: Oropharynx is clear.   Eyes:      Conjunctiva/sclera: Conjunctivae normal.      Pupils: Pupils are equal, round, and reactive to light.   Cardiovascular:      Rate and Rhythm: Normal rate and regular rhythm.      Pulses: Normal pulses.      Heart sounds: Normal heart sounds.   Pulmonary:      Effort: Pulmonary effort is normal.      Breath sounds: Normal breath sounds.   Abdominal:      General: Abdomen is flat. Bowel sounds are normal.      Palpations: Abdomen is soft.   Musculoskeletal:         General: Tenderness  present. Normal range of motion.      Cervical back: Normal range of motion.      Comments: LLE   Skin:     General: Skin is warm and dry.      Capillary Refill: Capillary refill takes less than 2 seconds.   Neurological:      Mental Status: He is alert and oriented to person, place, and time.   Psychiatric:         Mood and Affect: Mood normal.       Significant Labs: All pertinent labs within the past 24 hours have been reviewed.  BMP:   Recent Labs   Lab 12/01/22  0245   *   *   K 4.2   CL 99   CO2 24   BUN 14   CREATININE 1.05   CALCIUM 8.4*     CBC:   Recent Labs   Lab 11/30/22  1106 12/01/22  0245   WBC 7.10 12.80*   HGB 14.3 13.4*   HCT 44.2 41.3    249       Significant Imaging: I have reviewed all pertinent imaging results/findings within the past 24 hours.

## 2022-12-01 NOTE — PLAN OF CARE
Problem: Occupational Therapy  Goal: Occupational Therapy Goal  Description: ST.Pt will perform bathing with SBA  2.Pt will perform UE dressing with independence  3.Pt will perform LE dressing with CGA  4.Pt will transfer bed/chair/bsc with SBA with RW  5.Pt will perform standing task x 3 min with SBA with RW  6.Tolerate 10 min of tx without fatigue.      LTG:   Restore to max I with selfcare and mobility.     Outcome: Ongoing, Progressing

## 2022-12-01 NOTE — PT/OT/SLP PROGRESS
Occupational Therapy   Treatment    Name: Mukesh Loera  MRN: [id number]  Admitting Diagnosis:  S/P total knee arthroplasty, left  1 Day Post-Op    Recommendations:     Discharge Recommendations: home with home health, home health PT  Discharge Equipment Recommendations:  3-in-1 commode, crutches, walker, rolling  Barriers to discharge:  None    Assessment:     Mukesh Loera is a 57 y.o. male with a medical diagnosis of S/P total knee arthroplasty, left.  He presents with alert and agreeable to treatment. Performance deficits affecting function are weakness, impaired endurance, impaired self care skills, impaired functional mobility, gait instability, impaired balance, decreased lower extremity function, pain, decreased safety awareness.     Rehab Prognosis:  Good; patient would benefit from acute skilled OT services to address these deficits and reach maximum level of function.       Plan:     Patient to be seen 5 x/week to address the above listed problems via self-care/home management, therapeutic activities, therapeutic exercises  Plan of Care Expires: 12/07/22  Plan of Care Reviewed with: patient, spouse    Subjective     Pain/Comfort:  Pain Rating 1: 5/10  Location - Side 1: Left  Location 1: knee  Pain Addressed 1: Pre-medicate for activity, Distraction, Cessation of Activity  Pain Rating Post-Intervention 1: 5/10    Objective:     Communicated with: CHARLES Ríos prior to session.  Patient found HOB elevated with peripheral IV, cryotherapy upon OT entry to room.    General Precautions: Standard, fall   Orthopedic Precautions:LLE weight bearing as tolerated   Braces: Knee immobilizer  Respiratory Status: Room air     Occupational Performance:     Bed Mobility:    Patient completed Supine to Sit with minimum assistance     Functional Mobility/Transfers:  Patient completed Sit <> Stand Transfer with stand by assistance and contact guard assistance  with  rolling walker   Functional Mobility:  NT    Activities of Daily Living:  Bathing: PT setup for sponge bath from EOB with SBA    Upper Body Dressing: independence with pull over shirt and with button front shirt  Lower Body Dressing: minimum assistance for threading (L) LE through leg of underwear and pants      AMPA 6 Click ADL: 21    Treatment & Education:  D/C plans were discussed. Pt was educated to don pants and underwear on (L) LE first and doff (L) LE last.    Patient left sitting edge of bed with call button in reach, LPN Sharron notified, and Spouse present    GOALS:   Multidisciplinary Problems       Occupational Therapy Goals          Problem: Occupational Therapy    Goal Priority Disciplines Outcome Interventions   Occupational Therapy Goal     OT, PT/OT Ongoing, Progressing    Description: ST.Pt will perform bathing with SBA  2.Pt will perform UE dressing with independence  3.Pt will perform LE dressing with CGA  4.Pt will transfer bed/chair/bsc with SBA with RW  5.Pt will perform standing task x 3 min with SBA with RW  6.Tolerate 10 min of tx without fatigue.      LTG:   Restore to max I with selfcare and mobility.                          Time Tracking:     OT Date of Treatment: 22  OT Start Time: 50  OT Stop Time: 1011  OT Total Time (min): 21 min    Billable Minutes:Self Care/Home Management 21 min    OT/DELLA: OT          2022

## 2022-12-01 NOTE — PT/OT/SLP EVAL
Occupational Therapy   Evaluation    Name: Mukesh Loera  MRN: [id number]  Admitting Diagnosis:  S/P total knee arthroplasty, left  Recent Surgery: Procedure(s) (LRB):  ARTHROPLASTY, KNEE, TOTAL, USING COMPUTER-ASSISTED NAVIGATION (Left) 1 Day Post-Op    Recommendations:     Discharge Recommendations: home with home health, home health PT  Discharge Equipment Recommendations:  3-in-1 commode, crutches, walker, rolling  Barriers to discharge:       Assessment:     Mukesh Loera is a 57 y.o. male with a medical diagnosis of S/P total knee arthroplasty, left.  He presents with alert and agreeable to evaluation. Pt is still having effects from his spinal surgery. Performance deficits affecting function: weakness, impaired endurance, impaired self care skills, impaired functional mobility, gait instability, decreased lower extremity function, pain, decreased safety awareness, decreased ROM.      Rehab Prognosis: Good; patient would benefit from acute skilled OT services to address these deficits and reach maximum level of function.       Plan:     Patient to be seen 5 x/week to address the above listed problems via self-care/home management, therapeutic activities, therapeutic exercises  Plan of Care Expires: 12/07/22  Plan of Care Reviewed with: patient, spouse    Subjective     Chief Complaint: Pt is post op (L) TKR today- pt c/o not being able to feel and control his right leg when standing  Patient/Family Comments/goals: Pt plans to D/C to home with home healthcare    Occupational Profile:  Living Environment: Pt lives in single level home with 5 steps with a rail to enter  Previous level of function: Pt is independent and active with all ADLs/IADLs and mobility  Roles and Routines: Pt is  and works laying asphalt  Equipment Used at Home:  none  Assistance upon Discharge: Pt will have assistance from his spouse and from home health    Pain/Comfort:       Patients cultural, spiritual, Mormon  conflicts given the current situation: no    Objective:     Communicated with: RN prior to session.  Patient found HOB elevated with knee immobilizer, jenkins catheter, hemovac, blood pressure cuff, pulse ox (continuous), peripheral IV upon OT entry to room.    General Precautions: Standard, fall   Orthopedic Precautions:LLE weight bearing as tolerated   Braces:    Respiratory Status: Room air    Occupational Performance:    Bed Mobility:    Patient completed Supine to Sit with minimum assistance  Patient completed Sit to Supine with minimum assistance    Functional Mobility/Transfers:  Patient completed Sit <> Stand Transfer with minimum assistance  with  axillary crutches   Functional Mobility: Pt attempted a couple of steps, but did not have enough proprioception in his legs to be safe ambulating    Activities of Daily Living:  Upper Body Dressing: moderate assistance gown as robe    Cognitive/Visual Perceptual:  Cognitive/Psychosocial Skills:     -       Oriented to: Person, Place, and Situation   -       Follows Commands/attention:Follows one-step commands  -       Communication: clear/fluent  -       Safety awareness/insight to disability: impaired   -       Mood/Affect/Coping skills/emotional control: Cooperative and Pleasant    Physical Exam:  Balance:    -       sitting balance is good, standing balance if fair  Sensation:    -       Intact  Dominant hand:    -       right  Upper Extremity Range of Motion:     -       Right Upper Extremity: WNL  -       Left Upper Extremity: WNL  Upper Extremity Strength:    -       Right Upper Extremity: WNL  -       Left Upper Extremity: WNL   Strength:    -       Right Upper Extremity: WNL  -       Left Upper Extremity: WNL  Fine Motor Coordination:    -       Intact  Gross motor coordination:   WFL    AMPAC 6 Click ADL:  AMPAC Total Score:      Treatment & Education:  Pt educated on OT role/POC.   Importance of OOB activity with staff assistance.  Importance of  sitting up in the chair throughout the day as tolerated, especially for meals   Safety during functional t/f and mobility with use of crutches  Importance of assisting with self-care activities   All questions/concerns answered within OT scope of practice     Patient left HOB elevated with all lines intact, call button in reach, bed alarm on, RN notified, and spouse present    GOALS:   Multidisciplinary Problems       Occupational Therapy Goals          Problem: Occupational Therapy    Goal Priority Disciplines Outcome Interventions   Occupational Therapy Goal     OT, PT/OT Ongoing, Progressing    Description: ST.Pt will perform bathing with SBA  2.Pt will perform UE dressing with independence  3.Pt will perform LE dressing with CGA  4.Pt will transfer bed/chair/bsc with SBA with RW  5.Pt will perform standing task x 3 min with SBA with RW  6.Tolerate 10 min of tx without fatigue.      LTG:   Restore to max I with selfcare and mobility.                          History:     Past Medical History:   Diagnosis Date    Hypertension          Past Surgical History:   Procedure Laterality Date    ARTHROPLASTY, KNEE, TOTAL, USING COMPUTER-ASSISTED NAVIGATION Left 2022    Procedure: ARTHROPLASTY, KNEE, TOTAL, USING COMPUTER-ASSISTED NAVIGATION;  Surgeon: Damir Blakely III, MD;  Location: [facility];  Service: Orthopedics;  Laterality: Left;       Time Tracking:     OT Date of Treatment: 22  OT Start Time: 1423  OT Stop Time: 1440  OT Total Time (min): 17 min    Billable Minutes:Evaluation low complexity    2022

## 2022-12-01 NOTE — PT/OT/SLP PROGRESS
Physical Therapy Treatment    Patient Name:  Mukesh Loera   MRN:  [id number]    Recommendations:     Discharge Recommendations:  home health PT, home with home health   Discharge Equipment Recommendations: 3-in-1 commode, crutches   Barriers to discharge: None    Assessment:     Mukesh Loera is a 57 y.o. male admitted with a medical diagnosis of S/P total knee arthroplasty, left.  He presents with the following impairments/functional limitations:    Patient with good initial rom. Pain still controlled. Okay for home with crutches .    Rehab Prognosis: Good; patient would benefit from acute skilled PT services to address these deficits and reach maximum level of function.    Recent Surgery: Procedure(s) (LRB):  ARTHROPLASTY, KNEE, TOTAL, USING COMPUTER-ASSISTED NAVIGATION (Left) 1 Day Post-Op    Plan:     During this hospitalization, patient to be seen   to address the identified rehab impairments via gait training, therapeutic activities and progress toward the following goals:    Plan of Care Expires:  12/30/22    Subjective     Chief Complaint: Post op pain  Patient/Family Comments/goals: plan is for home today  Pain/Comfort:  Pain Rating 1: 5/10  Location - Side 1: Left  Location 1: knee  Pain Addressed 1: Cessation of Activity      Objective:     Communicated with nurse prior to session.  Patient found supine with   upon PT entry to room.     General Precautions: Standard,     Orthopedic Precautions:LLE weight bearing as tolerated   Braces:    Respiratory Status: Room air     Functional Mobility:  Bed Mobility:     Supine to Sit: contact guard assistance  Sit to Supine: contact guard assistance  Transfers:     Sit to Stand:  contact guard assistance with axillary crutches  Gait: ambulated 100 feet with crutches wbat, asceneded/descended 4 steps cga with crutches.       AM-PAC 6 CLICK MOBILITY  Turning over in bed (including adjusting bedclothes, sheets and blankets)?: 3  Sitting down on and  standing up from a chair with arms (e.g., wheelchair, bedside commode, etc.): 3  Moving from lying on back to sitting on the side of the bed?: 3  Moving to and from a bed to a chair (including a wheelchair)?: 3  Need to walk in hospital room?: 3  Climbing 3-5 steps with a railing?: 3  Basic Mobility Total Score: 18       Treatment & Education:  Left le: aps, qs, saq, slr, hs 3x10  Left le: 0-95 knee flexion    Patient left supine with call button in reach..    GOALS:   Multidisciplinary Problems       Physical Therapy Goals          Problem: Physical Therapy    Goal Priority Disciplines Outcome Goal Variances Interventions   Physical Therapy Goal     PT, PT/OT Ongoing, Progressing     Description: Short Term Goals  Independent with HEP  Independent with crutchesx 200 feet FWB/WBAT: left lower extremity    Long term goals  Needed equipment for home.                            Time Tracking:     PT Received On: 12/01/22  PT Start Time: 0840     PT Stop Time: 0905  PT Total Time (min): 25 min     Billable Minutes: Gait Training 10 and Therapeutic Exercise 15    Treatment Type: Treatment  PT/PTA: PT     PTA Visit Number: 0     12/01/2022

## 2022-12-01 NOTE — DISCHARGE SUMMARY
"                      Department of Orthopedic Surgery                 DISCHARGE SUMMARY           Patient Name: Mukesh Loera  : 1965  Age: 57 y.o.  Sex: male  LOS: 0    ADMISSION INFORMATION:  Admit Date: 2022    DISCHARGE INFORMATION:  Discharge Date/Time:  2022  Discharge Physician: Reddy  Discharged Condition: Stable      Admitting Diagnoses:  Primary osteoarthritis of left knee [M17.12]    PROCEDURE:  Procedure(s) (LRB):  ARTHROPLASTY, KNEE, TOTAL, USING COMPUTER-ASSISTED NAVIGATION (Left)        Discharge Diagnoses:  End-stage DJD left knee status post left total knee replacement during stay    HOSPITAL COURSE:   Mukesh Loera  is a 57 y.o. patient has severe DJD of his left knee failed conservative therapies lives with for long period of time desire total knee replacement risks benefits discussed at length patient was admitted  underwent computer navigated left total knee replacement implants has an 8 femur posterior stabilized 10 tibia base plate 41 patella and a 10 poly posterior stabilized    Physical therapy see him today start working on range of motion of his knee got today on worse work on motion home health set up he is on be discharged on narcotic pain pills and coated aspirin b.i.d.      Physical Exam for Today :  /75   Pulse 79   Temp 98.5 °F (36.9 °C)   Resp 17   Ht 6' 3" (1.905 m)   Wt 115.7 kg (255 lb)   SpO2 96%   BMI 31.87 kg/m²     He is neurovascularly intact thigh and calf were soft dressing is dry drain is in place alert oriented      IMAGING: X-Ray Knee 1 or 2 View Left  Narrative: EXAMINATION:  XR KNEE 1 OR 2 VIEW LEFT    CLINICAL HISTORY:  Postop total knee;    COMPARISON:  Left knee x-ray Vicky 3, 2022    TECHNIQUE:  Frontal and lateral views of the left knee.    FINDINGS:  Status post total left knee arthroplasty. No evidence of immediate hardware failure. Surgical drain/s and superficial skin staples overlie the knee. " Subcutaneous and joint space air noted which is likely postoperative.  Impression: Status post total left knee arthroplasty.    Point of Service: [facility]    Electronically signed by: Tino Carter  Date:    11/30/2022  Time:    10:43              DISCHARGE MEDS:  Percocet in coated aspirin b.i.d.    CONSULTS: IP CONSULT TO HOSPITAL MEDICINE  IP CONSULT TO SOCIAL WORK/CASE MANAGEMENT    Follow up:  Two weeks    DISPOSITION: Home-Health Care [facility]    CONDITION: Stable              Damir Blakely III  12/1/2022, 8:21 AM        (Subject to voice recognition error, transcription service not allowed)

## 2022-12-01 NOTE — PLAN OF CARE
[facility]  Discharge Final Note    Primary Care Provider: Primary Doctor No    Expected Discharge Date: 12/1/2022    Final Discharge Note (most recent)       Final Note - 12/01/22 0953          Final Note    Assessment Type Final Discharge Note     Anticipated Discharge Disposition Home-Health Care Svc        Post-Acute Status    Post-Acute Authorization Home Health     Home Health Status Set-up Complete/Auth obtained     Patient choice form signed by patient/caregiver List with quality metrics by geographic area provided;List from CMS Compare;List from System Post-Acute Care     Discharge Delays None known at this time                     Important Message from Medicare              Follow-up providers       Abdelrahman Winslow MD   Specialty: Family Medicine    [address] [facility] [place] [address]   Phone: [phone]       Next Steps: Go on 12/12/2022    Instructions: hosp f/u appt time 8:15 am    Damir Blakely III, MD   Specialty: Orthopedic Surgery    [address] [place] [address] [place] [address]   Phone: [phone]       Next Steps: Go in 2 week(s)    Instructions: 2 week hosp f/u LTKR; appt time 10:30 am              After-discharge care                Durable Medical Equipment       [facility]   Service: Durable Medical Equipment    [address] [place] [address] [place] [address]   Phone: [phone]                 Home Medical Care       [facility]   Service: Home Rehabilitation    [address] [facility] [place] [address]   Phone: [phone]                             Discharge plan for today. Home with wife. Sent updates to [facility] and notified Denise. Spoke with patient in his room. He has crutches and a rolling walker. Declined bedside commode as they realized they have one at home. Patient loaded as [employer] of [place] but patient not found in portal and patients insurance number not found either in the portal.

## 2022-12-01 NOTE — PROGRESS NOTES
[facility]  [facility] Medicine  Progress Note    Patient Name: Mukesh Loera  MRN: [id number]  Patient Class: OP- Hospital Outpatient Surgery   Admission Date: 11/30/2022  Length of Stay: 0 days  Attending Physician: Damir Blakely III, MD  Primary Care Provider: Primary Doctor No        Subjective:     Principal Problem:S/P total knee arthroplasty, left        HPI:  PT is a 57 year old male who presents today for Left Total Knee Arthroplasty performed by Dr. Reddy MD. Patient states he has been having left knee pain for the past 3 years. Patient states the pain has progressively gotten worse and he decided to see his PCP the beginning of this year and was treated with steroids and NSAID's.  PT's pain increased after treatment and affected his activities of daily living and he was referred to Dr. Blakely. Patient was prescribed Voltaren gel and Meloxicam, his pain improved for a few months. Patients pain increased and he decided it was time to get the surgery. Pt denies HA, Fever, Fatigue, CP or shortness of breath.     PT has a history of HTN, HLD and osteoarthritis of Left Knee. 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 Patient lives at home with his wife who plans to help him with therapy.    The patient was admitted to [facility] was consulted for medical management of this patient. Thank you Dr. Blakely for the consult.                 Overview/Hospital Course:  No notes on file    Interval History: Patient AAOX3. Patient states his knee is feeling better this morning after some pain medication. No distress noted.     Review of Systems   Constitutional: Negative.    HENT: Negative.     Eyes: Negative.    Respiratory: Negative.     Cardiovascular: Negative.    Gastrointestinal: Negative.    Endocrine: Negative.    Genitourinary:  Negative.    Musculoskeletal:  Positive for arthralgias.   Allergic/Immunologic: Negative.    Neurological: Negative.    Hematological: Negative.    Psychiatric/Behavioral: Negative.     Objective:     Vital Signs (Most Recent):  Temp: 98.5 °F (36.9 °C) (12/01/22 0730)  Pulse: 79 (12/01/22 0730)  Resp: 17 (12/01/22 0753)  BP: 102/75 (12/01/22 0730)  SpO2: 96 % (12/01/22 0730) Vital Signs (24h Range):  Temp:  [97.7 °F (36.5 °C)-99.7 °F (37.6 °C)] 98.5 °F (36.9 °C)  Pulse:  [] 79  Resp:  [0-21] 17  SpO2:  [91 %-100 %] 96 %  BP: (102-150)/(62-99) 102/75     Weight: 115.7 kg (255 lb)  Body mass index is 31.87 kg/m².    Intake/Output Summary (Last 24 hours) at 12/1/2022 0810  Last data filed at 12/1/2022 0522  Gross per 24 hour   Intake 1350 ml   Output 3640 ml   Net -2290 ml      Physical Exam  Constitutional:       Appearance: He is obese.   HENT:      Head: Normocephalic.      Right Ear: Tympanic membrane normal.      Left Ear: Tympanic membrane normal.      Nose: Nose normal.      Mouth/Throat:      Mouth: Mucous membranes are moist.      Pharynx: Oropharynx is clear.   Eyes:      Conjunctiva/sclera: Conjunctivae normal.      Pupils: Pupils are equal, round, and reactive to light.   Cardiovascular:      Rate and Rhythm: Normal rate and regular rhythm.      Pulses: Normal pulses.      Heart sounds: Normal heart sounds.   Pulmonary:      Effort: Pulmonary effort is normal.      Breath sounds: Normal breath sounds.   Abdominal:      General: Abdomen is flat. Bowel sounds are normal.      Palpations: Abdomen is soft.   Musculoskeletal:         General: Tenderness present. Normal range of motion.      Cervical back: Normal range of motion.      Comments: LLE   Skin:     General: Skin is warm and dry.      Capillary Refill: Capillary refill takes less than 2 seconds.   Neurological:      Mental Status: He is alert and oriented to person, place, and time.   Psychiatric:         Mood and Affect: Mood normal.        Significant Labs: All pertinent labs within the past 24 hours have been reviewed.  BMP:   Recent Labs   Lab 12/01/22  0245   *   *   K 4.2   CL 99   CO2 24   BUN 14   CREATININE 1.05   CALCIUM 8.4*     CBC:   Recent Labs   Lab 11/30/22  1106 12/01/22  0245   WBC 7.10 12.80*   HGB 14.3 13.4*   HCT 44.2 41.3    249       Significant Imaging: I have reviewed all pertinent imaging results/findings within the past 24 hours.      Assessment/Plan:      * S/P total knee arthroplasty, left  PT/OT evaluate and Treat  Foot Compression Pump BLE  Incentive Spirometry  Neurovascular checks Q4hrs  Norco 5-325 Q4hr  Aspirin 325 mg  Morphine 4mg          Essential hypertension  Continue Home Norvasc  CBC, BMP in am        Dyslipidemia   Continue Lipitor 20 mg  Lipid Profile  Hemoglobin A1C        VTE Risk Mitigation (From admission, onward)         Ordered     IP VTE HIGH RISK PATIENT  Once         11/30/22 1003     Place DANY hose  Until discontinued         11/30/22 1003                Discharge Planning   YARED: 12/1/2022     Code Status: Full Code   Is the patient medically ready for discharge?:     Reason for patient still in hospital (select all that apply): Treatment  Discharge Plan A: Home Health                  Abdelrahman Winslow MD  Department of Hospital Medicine   [facility]

## 2022-12-01 NOTE — DISCHARGE INSTRUCTIONS
*Keep dressing dry and intact, do not remove dressing, if dressing becomes wet or bloody notify home health staff.  Swingbed/Home Health will remove your drain (if you have one) on post op day #2 and change your dressing on post op day #3 and day #10, give them that special dressing we sent home with you.  *Continue incentive spirometry at least every 2 hours while awake.  *Continue white stockings remove 2 times a day for 1 hour and replace. Once dressing is changed, apply other stocking to surgery leg.   *Continue cool-jet to knee. Do not apply directly to skin.   *Take laxative of choice to have a bowel movement at least by tomorrow and then every other day.  *Increase fluids by mouth.  *Staples will be removed at follow up appointment in 2 weeks from surgery.  *Elevate surgery leg on pillow at ankle. No pillow under knees.  *Notify swingbed/home health staff of any concerns.

## 2022-12-12 ENCOUNTER — OFFICE VISIT (OUTPATIENT)
Dept: FAMILY MEDICINE | Facility: CLINIC | Age: 57
End: 2022-12-12
Payer: COMMERCIAL

## 2022-12-12 VITALS
DIASTOLIC BLOOD PRESSURE: 81 MMHG | HEIGHT: 75 IN | SYSTOLIC BLOOD PRESSURE: 130 MMHG | WEIGHT: 255 LBS | RESPIRATION RATE: 20 BRPM | HEART RATE: 82 BPM | OXYGEN SATURATION: 97 % | BODY MASS INDEX: 31.71 KG/M2 | TEMPERATURE: 98 F

## 2022-12-12 DIAGNOSIS — R79.89 LOW TSH LEVEL: Primary | ICD-10-CM

## 2022-12-12 DIAGNOSIS — I10 HYPERTENSION, UNSPECIFIED TYPE: ICD-10-CM

## 2022-12-12 DIAGNOSIS — Z00.00 HEALTH CARE MAINTENANCE: ICD-10-CM

## 2022-12-12 DIAGNOSIS — Z96.652 S/P TOTAL KNEE ARTHROPLASTY, LEFT: ICD-10-CM

## 2022-12-12 DIAGNOSIS — N52.9 ERECTILE DYSFUNCTION, UNSPECIFIED ERECTILE DYSFUNCTION TYPE: ICD-10-CM

## 2022-12-12 DIAGNOSIS — E78.5 DYSLIPIDEMIA: ICD-10-CM

## 2022-12-12 LAB
HCV AB SER QL: NORMAL
HIV 1+O+2 AB SERPL QL: NORMAL
T4 FREE SERPL-MCNC: 0.96 NG/DL (ref 0.76–1.46)

## 2022-12-12 PROCEDURE — 3008F PR BODY MASS INDEX (BMI) DOCUMENTED: ICD-10-PCS | Mod: ,,, | Performed by: FAMILY MEDICINE

## 2022-12-12 PROCEDURE — 84439 ASSAY OF FREE THYROXINE: CPT | Mod: ,,, | Performed by: CLINICAL MEDICAL LABORATORY

## 2022-12-12 PROCEDURE — 1159F PR MEDICATION LIST DOCUMENTED IN MEDICAL RECORD: ICD-10-PCS | Mod: ,,, | Performed by: FAMILY MEDICINE

## 2022-12-12 PROCEDURE — 3044F PR MOST RECENT HEMOGLOBIN A1C LEVEL <7.0%: ICD-10-PCS | Mod: ,,, | Performed by: FAMILY MEDICINE

## 2022-12-12 PROCEDURE — 3079F PR MOST RECENT DIASTOLIC BLOOD PRESSURE 80-89 MM HG: ICD-10-PCS | Mod: ,,, | Performed by: FAMILY MEDICINE

## 2022-12-12 PROCEDURE — 99214 OFFICE O/P EST MOD 30 MIN: CPT | Mod: ,,, | Performed by: FAMILY MEDICINE

## 2022-12-12 PROCEDURE — 3079F DIAST BP 80-89 MM HG: CPT | Mod: ,,, | Performed by: FAMILY MEDICINE

## 2022-12-12 PROCEDURE — 84439 T4, FREE: ICD-10-PCS | Mod: ,,, | Performed by: CLINICAL MEDICAL LABORATORY

## 2022-12-12 PROCEDURE — 3075F SYST BP GE 130 - 139MM HG: CPT | Mod: ,,, | Performed by: FAMILY MEDICINE

## 2022-12-12 PROCEDURE — 86803 HEPATITIS C ANTIBODY: ICD-10-PCS | Mod: ,,, | Performed by: CLINICAL MEDICAL LABORATORY

## 2022-12-12 PROCEDURE — 99214 PR OFFICE/OUTPT VISIT, EST, LEVL IV, 30-39 MIN: ICD-10-PCS | Mod: ,,, | Performed by: FAMILY MEDICINE

## 2022-12-12 PROCEDURE — 87389 HIV-1 AG W/HIV-1&-2 AB AG IA: CPT | Mod: ,,, | Performed by: CLINICAL MEDICAL LABORATORY

## 2022-12-12 PROCEDURE — 1159F MED LIST DOCD IN RCRD: CPT | Mod: ,,, | Performed by: FAMILY MEDICINE

## 2022-12-12 PROCEDURE — 3008F BODY MASS INDEX DOCD: CPT | Mod: ,,, | Performed by: FAMILY MEDICINE

## 2022-12-12 PROCEDURE — 87389 HIV 1 / 2 ANTIBODY: ICD-10-PCS | Mod: ,,, | Performed by: CLINICAL MEDICAL LABORATORY

## 2022-12-12 PROCEDURE — 86803 HEPATITIS C AB TEST: CPT | Mod: ,,, | Performed by: CLINICAL MEDICAL LABORATORY

## 2022-12-12 PROCEDURE — 3044F HG A1C LEVEL LT 7.0%: CPT | Mod: ,,, | Performed by: FAMILY MEDICINE

## 2022-12-12 PROCEDURE — 3075F PR MOST RECENT SYSTOLIC BLOOD PRESS GE 130-139MM HG: ICD-10-PCS | Mod: ,,, | Performed by: FAMILY MEDICINE

## 2022-12-12 RX ORDER — ATORVASTATIN CALCIUM 20 MG/1
20 TABLET, FILM COATED ORAL DAILY
Qty: 90 TABLET | Refills: 3 | Status: SHIPPED | OUTPATIENT
Start: 2022-12-12 | End: 2023-02-23 | Stop reason: SDUPTHER

## 2022-12-12 RX ORDER — TADALAFIL 10 MG/1
10 TABLET ORAL DAILY PRN
Qty: 30 TABLET | Refills: 1 | Status: SHIPPED | OUTPATIENT
Start: 2022-12-12 | End: 2023-02-23 | Stop reason: SDUPTHER

## 2022-12-12 RX ORDER — AMLODIPINE BESYLATE 10 MG/1
10 TABLET ORAL DAILY
Qty: 90 TABLET | Refills: 3 | Status: SHIPPED | OUTPATIENT
Start: 2022-12-12 | End: 2023-12-20

## 2022-12-12 NOTE — PROGRESS NOTES
Subjective:       Patient ID: Mukesh Loera is a 57 y.o. male.    Chief Complaint: Transitional Care    This is a 57 y.o. male with PMH of OA, HTN, and Dyslipidemia who presents today for hospital follow up and new patient establishment. Patient recently discharged from hospital after a left total knee replacement. PT is now using a walker and states he improving daily with home physical therapy. PT only complaint today is left knee tenderness from his surgery and decreased appetite since his surgery. He states his appetite is improving but still not where it was prior to surgery. PT denies HA, Fever, Fatigue, Shortness of breath, or chest pain.    Plan for today is to draw a free T4 because of his low TSH and close some care gaps. We will get Hepatitis C screening, HIV screening and refer him for a Colorectal Cancer Screening.        Current Outpatient Medications:     aspirin (ECOTRIN) 325 MG EC tablet, Take 1 tablet (325 mg total) by mouth 2 (two) times daily., Disp: , Rfl: 0    meloxicam (MOBIC) 7.5 MG tablet, TAKE 1 TABLET BY MOUTH EVERY DAY, Disp: 30 tablet, Rfl: 1    oxyCODONE-acetaminophen (PERCOCET) 7.5-325 mg per tablet, Take 1 tablet by mouth every 6 (six) hours as needed for Pain., Disp: 20 tablet, Rfl: 0    amLODIPine (NORVASC) 10 MG tablet, Take 1 tablet (10 mg total) by mouth once daily., Disp: 90 tablet, Rfl: 3    atorvastatin (LIPITOR) 20 MG tablet, Take 1 tablet (20 mg total) by mouth once daily., Disp: 90 tablet, Rfl: 3    tadalafiL (CIALIS) 10 MG tablet, Take 1 tablet (10 mg total) by mouth daily as needed for Erectile Dysfunction., Disp: 30 tablet, Rfl: 1    Review of patient's allergies indicates:  No Known Allergies    Past Medical History:   Diagnosis Date    Hypertension        Past Surgical History:   Procedure Laterality Date    ARTHROPLASTY, KNEE, TOTAL, USING COMPUTER-ASSISTED NAVIGATION Left 11/30/2022    Procedure: ARTHROPLASTY, KNEE, TOTAL, USING COMPUTER-ASSISTED NAVIGATION;   Surgeon: Damir Blakely III, MD;  Location: [facility];  Service: Orthopedics;  Laterality: Left;       Family History   Problem Relation Age of Onset    Hypertension Mother        Social History     Tobacco Use    Smoking status: Never    Smokeless tobacco: Never   Substance Use Topics    Alcohol use: Yes    Drug use: Not Currently     Types: Marijuana       Review of Systems   Constitutional:  Negative for fatigue and fever.   HENT:  Negative for ear pain, facial swelling, hearing loss, sneezing and sore throat.    Eyes: Negative.    Respiratory:  Negative for cough and shortness of breath.    Cardiovascular:  Negative for chest pain and leg swelling.   Gastrointestinal:  Positive for diarrhea. Negative for abdominal distention, abdominal pain and fecal incontinence.   Endocrine: Negative.    Genitourinary:  Positive for erectile dysfunction. Negative for bladder incontinence, decreased urine volume, difficulty urinating and urgency.   Musculoskeletal:  Positive for arthralgias.   Integumentary:  Negative.   Allergic/Immunologic: Negative.    Neurological:  Negative for light-headedness and headaches.   Hematological: Negative.    Psychiatric/Behavioral: Negative.         Current Medications:   Medication List with Changes/Refills   New Medications    TADALAFIL (CIALIS) 10 MG TABLET    Take 1 tablet (10 mg total) by mouth daily as needed for Erectile Dysfunction.       Start Date: 12/12/2022End Date: 2/10/2023   Current Medications    ASPIRIN (ECOTRIN) 325 MG EC TABLET    Take 1 tablet (325 mg total) by mouth 2 (two) times daily.       Start Date: 11/30/2022End Date: 11/30/2023    MELOXICAM (MOBIC) 7.5 MG TABLET    TAKE 1 TABLET BY MOUTH EVERY DAY       Start Date: 8/9/2022  End Date: --    OXYCODONE-ACETAMINOPHEN (PERCOCET) 7.5-325 MG PER TABLET    Take 1 tablet by mouth every 6 (six) hours as needed for Pain.       Start Date: 12/6/2022 End Date: --   Changed and/or Refilled Medications    Modified  "Medication Previous Medication    AMLODIPINE (NORVASC) 10 MG TABLET amLODIPine (NORVASC) 10 MG tablet       Take 1 tablet (10 mg total) by mouth once daily.    Take 1 tablet (10 mg total) by mouth once daily.       Start Date: 12/12/2022End Date: 12/12/2023    Start Date: 5/25/2022 End Date: 12/12/2022    ATORVASTATIN (LIPITOR) 20 MG TABLET atorvastatin (LIPITOR) 20 MG tablet       Take 1 tablet (20 mg total) by mouth once daily.    Take 1 tablet (20 mg total) by mouth once daily.       Start Date: 12/12/2022End Date: 12/12/2023    Start Date: 5/25/2022 End Date: 12/12/2022            Objective:        Vitals:    12/12/22 0935   BP: 130/81   BP Location: Right arm   Patient Position: Sitting   BP Method: Medium (Automatic)   Pulse: 82   Resp: 20   Temp: 98.2 °F (36.8 °C)   TempSrc: Oral   SpO2: 97%   Weight: 115.7 kg (255 lb)   Height: 6' 3" (1.905 m)       Physical Exam  Constitutional:       Appearance: He is obese.   HENT:      Head: Normocephalic.      Right Ear: Tympanic membrane normal.      Left Ear: Tympanic membrane normal.      Nose: Nose normal.      Mouth/Throat:      Mouth: Mucous membranes are dry.      Pharynx: Oropharynx is clear.   Eyes:      Conjunctiva/sclera: Conjunctivae normal.      Pupils: Pupils are equal, round, and reactive to light.   Cardiovascular:      Rate and Rhythm: Normal rate and regular rhythm.      Pulses: Normal pulses.      Heart sounds: Normal heart sounds.   Pulmonary:      Effort: Pulmonary effort is normal.      Breath sounds: Normal breath sounds.   Abdominal:      General: Bowel sounds are normal.      Palpations: Abdomen is soft.   Musculoskeletal:         General: Tenderness present. Normal range of motion.      Cervical back: Normal range of motion.      Comments: Left Knee tenderness post surgery   Skin:     General: Skin is warm and dry.      Capillary Refill: Capillary refill takes less than 2 seconds.   Neurological:      Mental Status: He is alert and oriented to " person, place, and time.   Psychiatric:         Mood and Affect: Mood normal.         Behavior: Behavior normal.             Lab Results   Component Value Date    WBC 12.80 (H) 12/01/2022    HGB 13.4 (L) 12/01/2022    HCT 41.3 12/01/2022     12/01/2022    CHOL 169 12/01/2022    TRIG 67 12/01/2022    HDL 56 12/01/2022    ALT 21 12/01/2022    AST 15 12/01/2022     (L) 12/01/2022    K 4.2 12/01/2022    CL 99 12/01/2022    CREATININE 1.05 12/01/2022    BUN 14 12/01/2022    CO2 24 12/01/2022    TSH 0.297 (L) 12/01/2022    INR 1.02 11/22/2022    INR 1.02 11/22/2022    HGBA1C 6.0 12/01/2022      Assessment:       1. Low TSH level    2. Dyslipidemia    3. Hypertension, unspecified type    4. Erectile dysfunction, unspecified erectile dysfunction type    5. Health care maintenance    6. S/P total knee arthroplasty, left          Plan:         Problem List Items Addressed This Visit          Cardiac/Vascular    Dyslipidemia     Atorvastatin 20mg reordered            Renal/    Erectile dysfunction     Cialis 10 mg  Will follow up          Relevant Medications    tadalafiL (CIALIS) 10 MG tablet       Orthopedic    S/P total knee arthroplasty, left     Walking with walker today.   Home physical therapy  Percocet as needed            Other Visit Diagnoses       Low TSH level    -  Primary    Relevant Orders    T4, Free    Hypertension, unspecified type        Relevant Medications    amLODIPine (NORVASC) 10 MG tablet    atorvastatin (LIPITOR) 20 MG tablet    Health care maintenance        Relevant Orders    Hepatitis C Antibody    HIV 1/2 Ag/Ab (4th Gen)    Ambulatory referral/consult to Gastroenterology              Follow up in about 1 month (around 1/12/2023) for BP follow up and health maintenance.    Abdelrahman Winslow MD     Instructed patient that if symptoms fail to improve or worsen patient should seek immediate medical attention or report to the nearest emergency department. Patient expressed verbal agreement  and understanding to this plan of care.       Pt with laceration to left 4th finger

## 2022-12-12 NOTE — PROGRESS NOTES
I have reviewed the notes, assessments, and/or procedures performed by Dr Winslow, I concur with his documentation of Mukesh Loera.

## 2022-12-19 DIAGNOSIS — Z96.652 S/P TOTAL KNEE ARTHROPLASTY, LEFT: Primary | ICD-10-CM

## 2023-01-04 ENCOUNTER — CLINICAL SUPPORT (OUTPATIENT)
Dept: REHABILITATION | Facility: HOSPITAL | Age: 58
End: 2023-01-04
Payer: COMMERCIAL

## 2023-01-04 DIAGNOSIS — Z96.652 S/P TOTAL KNEE REPLACEMENT, LEFT: Primary | ICD-10-CM

## 2023-01-04 DIAGNOSIS — M25.662 DECREASED RANGE OF MOTION (ROM) OF LEFT KNEE: ICD-10-CM

## 2023-01-04 DIAGNOSIS — R29.898 WEAKNESS OF LEFT LEG: ICD-10-CM

## 2023-01-04 DIAGNOSIS — M25.562 ACUTE PAIN OF LEFT KNEE: ICD-10-CM

## 2023-01-04 DIAGNOSIS — Z96.652 S/P TOTAL KNEE ARTHROPLASTY, LEFT: ICD-10-CM

## 2023-01-04 PROCEDURE — 97110 THERAPEUTIC EXERCISES: CPT

## 2023-01-04 PROCEDURE — 97161 PT EVAL LOW COMPLEX 20 MIN: CPT

## 2023-01-04 NOTE — PLAN OF CARE
[facility]   Physical Therapy Initial Evaluation    Date: 1/4/2023   Name: Mukesh Loera  Clinic Number: [id number]    Therapy Diagnosis: Left TKA  Physician: Damir Blakely III, MD    Physician Orders: PT Eval and Treat   Medical Diagnosis from Referral: Left TKA  Evaluation Date: 1/4/2023  DOS : 11/30/2022  Updated Plan of Care Due : 2/3/2023  Authorization Period Expiration: 12/19/2023  Plan of Care Expiration: 3/3/2023  Visit # / Visits authorized: 1/ 18    Time In: 10:45 am   Time Out: 11:35 am   Total Appointment Time (timed & untimed codes): 50 minutes    Precautions: Standard    Subjective   Date of onset: 11/30/2022  History of current condition - RAY reports: He had severe OA in the Left Knee for years and subsequently underwent a Left TKA on 11/30/2022. He has been receiving Home Health Physical Therapy but Dr Blakely is worried about his Range of Motion and has referred patient to begin Outpatient Physical Therapy.     Patient saw his Orthopedic surgeon on 12/15/2022 :   History:  12/15/2022   Mukesh Loera is a 57 y.o.  status post status post left total knee 11/30/2022 home health coming to his house he is on full-strength aspirin b.i.d.      PE:   He can not quite get the 90 day so were behind there is incision looks good his thigh and calf were soft will get his staples out today he is neurovascularly intact   Ass/Plan:  Stressed to him he got a work on motion and will check on about 3 weeks I wrote a prescription for outpatient PT since he can get to where he can get there      Medical History:   Past Medical History:   Diagnosis Date    Hypertension        Surgical History:   Mukesh Loera  has a past surgical history that includes arthroplasty, knee, total, using computer-assisted navigation (Left, 11/30/2022).    Medications:   Mukesh has a current medication list which includes the following prescription(s): amlodipine, aspirin, atorvastatin, meloxicam,  "oxycodone-acetaminophen, and tadalafil.    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     Pain:  Current 3/10, worst 8/10, best 0/10   Location: left knee    Description: Aching, Throbbing, Tight, and Sharp  Aggravating Factors: Standing, Walking, Flexing, and Getting out of bed/chair  Easing Factors: relaxation, pain medication, ice, and rest    Patients goals: "I need to get my Left Knee bending like my Right one. And I need to walk again. I have to use this walker right now."    Objective       Incision :    Well Healed - a few steri strips still in place       Girth Measurements :      Right Lower Extremity :  Mid Patella 42  cm         Left Lower Extremity :  Mid Patella 49.5  cm        Comments : Patient here today for Outpatient Physical Therapy Evaluation with Rolling walker for support. Patient keeps knee flexed at approx 60 degrees most of the time as this appears to be his position of comfort.         Range of Motion/Strength :                  Left Extremity                                                                        Right Extremity   AROM PROM Strength  Location  AROM    PROM   Strength    Full    4/5   Hip      Flexion (140)  Full    5/5                    Extension (10)                       Internal Rotation (40)                       External Rotation (50)                       Abduction (45)                       Adduction (30)       70  80  3-/5   Knee    Flexion (140)  135    5/5    -15  -10                 Extension (0)  +2     Full    5/5   Ankle   Dorsiflexion (20)  Full    5/5                      Plantar Flexion (50)                        Inversion " (35)                        Eversion (25)                   Functional Impairments :  Patient is unable to flex the knee to 90 degrees and therefore sit to stand is difficult. He requires use of arms and additional time to perform sit to stand. Patient requires use of a rolling walker for ambulation. Patient is noted to have decreased stance time and antalgic gait on the Left. Patient has decreased heel strike and hits foot flat due to lack of Full Knee Extension with initial contact on the Left. Patient has decreased step/stride length resulting in slow eliana.        Limitation/Restriction for FOTO Intake Knee Survey    Therapist reviewed FOTO scores for Mukesh Loera on 1/4/2023.   FOTO documents entered into Known - see Media section.    Limitation Score: 64%         TREATMENT   Treatment Time In: 11:15 am   Treatment Time Out: 11:30 am   Total Treatment time (time-based codes) separate from Evaluation: 15 minutes    MUKESH received the treatments listed below:  THERAPEUTIC EXERCISES to develop strength, endurance, ROM, and flexibility for 15 minutes including :  Initiated the Total Knee Home Exercise Program       Home Exercises and Patient Education Provided    Education provided:   - Discussed the findings from the Evaluation, Reviewed the Plan of Care, and Instructed patient on their Home Exercise Program      Written Home Exercises Provided: yes.  Exercises were reviewed and MUKESH was able to demonstrate them prior to the end of the session.  MUKESH demonstrated fair  understanding of the education provided.     See EMR under Patient Instructions for exercises provided 1/4/2023.    Assessment   Mukesh is a 57 y.o. male referred to outpatient Physical Therapy with a medical diagnosis of Left TKA. MUKESH reports: He had severe OA in the Left Knee for years and subsequently underwent a Left TKA on 11/30/2022. He has been receiving Home Health Physical Therapy but Dr Blakely is worried about his Range of Motion and  has referred patient to begin Outpatient Physical Therapy.   Patient presents with decreased Left knee Range of Motion with Flexion limited to 80 degrees and Extension at -10 degrees from zero  at time of Evaluation. Patient keeps knee flexed at approx 60 degrees most of the time as this appears to be his position of comfort. Patient is unable to flex the knee to 90 degrees and therefore sit to stand is difficult. He requires use of arms and additional time to perform sit to stand. Patient requires use of a rolling walker for ambulation. Patient is noted to have decreased stance time and antalgic gait on the Left. Patient has decreased heel strike and hits foot flat due to lack of Full Knee Extension with initial contact on the Left. Patient has decreased step/stride length resulting in slow eliana. A Home Exercise Program was established today. All exercises were reviewed with the patient but this will need to be reinforced due to decreased health literacy. Patient will benefit from skilled Physical Therapy intervention to address all deficits and to help him return to his prior level of function.    Patient prognosis is Good.   Patientt will benefit from skilled outpatient Physical Therapy to address the deficits stated above and in the chart below, provide patient /family education, and to maximize patientt's level of independence.     Plan of care discussed with patient: Yes  Patient's spiritual, cultural and educational needs considered and patient is agreeable to the plan of care and goals as stated below:     Anticipated Barriers for therapy: None    Goals:  Short Term Goals: 4 weeks   1. Independent with Home Exercise Program   2. Increase Left Knee Range of Motion to 0 Degrees to 100 Degrees  3. Increase Left Knee Strength to grossly 4/5  4. Patient will ambulate 500 feet without an assistive device with complaints of pain Less than or Equal to 4/10.      Long Term Goals: 8 weeks   1. Patient will increase  Left Knee Strength to grossly 4+/5  2. Patient will increase Left Knee Range of Motion to 0 Degrees to 120 Degrees   3. Patient will ambulate 1000+ feet with step length more equal from Left to Right and with complaints of pain Less than or Equal to 2/10.        Plan   Plan of care Certification: 1/4/2023 to 3/3/2023.    Outpatient Physical Therapy 2 times weekly for 8 weeks to include the following interventions: Electrical Stimulation to increase strength and decrease pain and inflammation as needed, Gait Training, Manual Therapy, Moist Heat/ Ice, Neuromuscular Re-ed, Patient Education, Therapeutic Activities, and Therapeutic Exercise.     MARLENE RICHEY, PT, DPT       I CERTIFY THE NEED FOR THESE SERVICES FURNISHED UNDER THIS PLAN OF TREATMENT AND WHILE UNDER MY CARE.    Physician's comments:      Physician's Signature: ___________________________________________________

## 2023-01-04 NOTE — PROGRESS NOTES
See Plan of Care     Sup Visit performed today with YEHUDA Simpson and YEHUDA Britt.  All goals and treatment plan reviewed. Will work toward completion of all goals set.     First Treatment will review and reinforce the Home Exercise Program. Will move to advanced exercises after this. Focus on increasing Knee Flexion and restoring gait without an assistive device.    First Treatment May Include :     Bike   SB  Hamstring Stretch on Stairs   Knee Flexion Stretch on Stairs     Supine :   Quad Sets  Heel Slides   Short Arc Quads   Straight Leg Raises   Hip Abduction   Bridging     Sitting :  Marching   TKEs  Hamstring Curls   Hip Abd/Add  Ankle Pumps     Standing :  Standing Marches  Squat to chair/Shallow standing knee bends  Hip Abduction   Heel Raises     Single Leg Stance - Firm   Single Leg Stance - Foam

## 2023-01-05 ENCOUNTER — HOSPITAL ENCOUNTER (OUTPATIENT)
Dept: RADIOLOGY | Facility: HOSPITAL | Age: 58
Discharge: HOME | End: 2023-01-05
Attending: ORTHOPAEDIC SURGERY
Payer: COMMERCIAL

## 2023-01-05 ENCOUNTER — OFFICE VISIT (OUTPATIENT)
Dept: ORTHOPEDICS | Facility: CLINIC | Age: 58
End: 2023-01-05
Payer: COMMERCIAL

## 2023-01-05 ENCOUNTER — OFFICE VISIT (OUTPATIENT)
Dept: FAMILY MEDICINE | Facility: CLINIC | Age: 58
End: 2023-01-05
Payer: COMMERCIAL

## 2023-01-05 VITALS
WEIGHT: 239.81 LBS | SYSTOLIC BLOOD PRESSURE: 127 MMHG | HEART RATE: 68 BPM | DIASTOLIC BLOOD PRESSURE: 80 MMHG | RESPIRATION RATE: 18 BRPM | BODY MASS INDEX: 29.82 KG/M2 | OXYGEN SATURATION: 96 % | HEIGHT: 75 IN | TEMPERATURE: 98 F

## 2023-01-05 DIAGNOSIS — R60.1 GENERALIZED EDEMA: ICD-10-CM

## 2023-01-05 DIAGNOSIS — Z96.652 S/P TOTAL KNEE ARTHROPLASTY, LEFT: ICD-10-CM

## 2023-01-05 DIAGNOSIS — Z09 FOLLOW-UP EXAMINATION, FOLLOWING OTHER SURGERY: Primary | ICD-10-CM

## 2023-01-05 DIAGNOSIS — I82.442 ACUTE DEEP VEIN THROMBOSIS (DVT) OF TIBIAL VEIN OF LEFT LOWER EXTREMITY: ICD-10-CM

## 2023-01-05 PROBLEM — I82.449 ACUTE DEEP VEIN THROMBOSIS (DVT) OF TIBIAL VEIN: Status: ACTIVE | Noted: 2023-01-05

## 2023-01-05 PROCEDURE — 93971 EXTREMITY STUDY: CPT | Mod: TC,LT

## 2023-01-05 PROCEDURE — 99213 PR OFFICE/OUTPT VISIT, EST, LEVL III, 20-29 MIN: ICD-10-PCS | Mod: GC,,, | Performed by: SPECIALIST

## 2023-01-05 PROCEDURE — 3008F BODY MASS INDEX DOCD: CPT | Mod: ,,, | Performed by: SPECIALIST

## 2023-01-05 PROCEDURE — 73560 X-RAY EXAM OF KNEE 1 OR 2: CPT | Mod: TC,LT

## 2023-01-05 PROCEDURE — 99213 OFFICE O/P EST LOW 20 MIN: CPT | Mod: GC,,, | Performed by: SPECIALIST

## 2023-01-05 PROCEDURE — 3008F PR BODY MASS INDEX (BMI) DOCUMENTED: ICD-10-PCS | Mod: ,,, | Performed by: SPECIALIST

## 2023-01-05 PROCEDURE — 73560 X-RAY EXAM OF KNEE 1 OR 2: CPT | Mod: 26,LT,, | Performed by: ORTHOPAEDIC SURGERY

## 2023-01-05 PROCEDURE — 99212 OFFICE O/P EST SF 10 MIN: CPT | Mod: PBBFAC,25 | Performed by: ORTHOPAEDIC SURGERY

## 2023-01-05 PROCEDURE — 93971 US LOWER EXTREMITY VEINS LEFT: ICD-10-PCS | Mod: 26,LT,, | Performed by: RADIOLOGY

## 2023-01-05 PROCEDURE — 99024 PR POST-OP FOLLOW-UP VISIT: ICD-10-PCS | Mod: ,,, | Performed by: ORTHOPAEDIC SURGERY

## 2023-01-05 PROCEDURE — 3074F PR MOST RECENT SYSTOLIC BLOOD PRESSURE < 130 MM HG: ICD-10-PCS | Mod: ,,, | Performed by: SPECIALIST

## 2023-01-05 PROCEDURE — 1159F PR MEDICATION LIST DOCUMENTED IN MEDICAL RECORD: ICD-10-PCS | Mod: ,,, | Performed by: SPECIALIST

## 2023-01-05 PROCEDURE — 3079F DIAST BP 80-89 MM HG: CPT | Mod: ,,, | Performed by: SPECIALIST

## 2023-01-05 PROCEDURE — 73560 XR KNEE 1 OR 2 VIEW LEFT: ICD-10-PCS | Mod: 26,LT,, | Performed by: ORTHOPAEDIC SURGERY

## 2023-01-05 PROCEDURE — 99024 POSTOP FOLLOW-UP VISIT: CPT | Mod: ,,, | Performed by: ORTHOPAEDIC SURGERY

## 2023-01-05 PROCEDURE — 3074F SYST BP LT 130 MM HG: CPT | Mod: ,,, | Performed by: SPECIALIST

## 2023-01-05 PROCEDURE — 93971 EXTREMITY STUDY: CPT | Mod: 26,LT,, | Performed by: RADIOLOGY

## 2023-01-05 PROCEDURE — 1159F MED LIST DOCD IN RCRD: CPT | Mod: ,,, | Performed by: SPECIALIST

## 2023-01-05 PROCEDURE — 3079F PR MOST RECENT DIASTOLIC BLOOD PRESSURE 80-89 MM HG: ICD-10-PCS | Mod: ,,, | Performed by: SPECIALIST

## 2023-01-05 NOTE — PROGRESS NOTES
Subjective:       Patient ID: Mukesh Loera is a 57 y.o. male.    Chief Complaint: Knee Pain (Had US done today and they recommended for patient to start taking blood thinner/Total knee (left) on  Nov 2022)    Patient is a 57 year old male that present to the clinic s/p L TKR 11/30/2022 by Dr. Blakely with a diagnosis today of DVT in left tibial vein, non-occlusive. The patient has a PMHx of HTN, HLD and ED. The patient today was at a follow up appointment with his orthopedic surgeon Dr. Blakely when the patient complained of swelling and tenderness in his L lower extremity. He was sent to receive US of the lower extremities where it showed a non occlusive DVT in the L tibial vein. He was given an Eliquis discount card and prescribed Eliquis. He will follow up with his PCP and continue do see Dr. Blakely s/p L TKR. The patient otherwise has no complaints and denies CP, headache or SOB.       Current Outpatient Medications:     amLODIPine (NORVASC) 10 MG tablet, Take 1 tablet (10 mg total) by mouth once daily., Disp: 90 tablet, Rfl: 3    aspirin (ECOTRIN) 325 MG EC tablet, Take 1 tablet (325 mg total) by mouth 2 (two) times daily., Disp: , Rfl: 0    atorvastatin (LIPITOR) 20 MG tablet, Take 1 tablet (20 mg total) by mouth once daily., Disp: 90 tablet, Rfl: 3    meloxicam (MOBIC) 7.5 MG tablet, TAKE 1 TABLET BY MOUTH EVERY DAY, Disp: 30 tablet, Rfl: 1    oxyCODONE-acetaminophen (PERCOCET) 7.5-325 mg per tablet, Take 1 tablet by mouth every 6 (six) hours as needed for Pain., Disp: 20 tablet, Rfl: 0    tadalafiL (CIALIS) 10 MG tablet, Take 1 tablet (10 mg total) by mouth daily as needed for Erectile Dysfunction., Disp: 30 tablet, Rfl: 1    apixaban (ELIQUIS) 5 mg Tab, Take 2 tablets (10 mg total) by mouth 2 (two) times daily. for 7 days, Disp: 28 tablet, Rfl: 0    [START ON 1/13/2023] apixaban (ELIQUIS) 5 mg Tab, Take 1 tablet (5 mg total) by mouth 2 (two) times daily., Disp: 60 tablet, Rfl: 6    Review  of patient's allergies indicates:  No Known Allergies    Past Medical History:   Diagnosis Date    Hypertension        Past Surgical History:   Procedure Laterality Date    ARTHROPLASTY, KNEE, TOTAL, USING COMPUTER-ASSISTED NAVIGATION Left 11/30/2022    Procedure: ARTHROPLASTY, KNEE, TOTAL, USING COMPUTER-ASSISTED NAVIGATION;  Surgeon: Damir Blakely III, MD;  Location: [facility];  Service: Orthopedics;  Laterality: Left;       Family History   Problem Relation Age of Onset    Hypertension Mother        Social History     Tobacco Use    Smoking status: Never    Smokeless tobacco: Never   Substance Use Topics    Alcohol use: Yes    Drug use: Not Currently     Types: Marijuana       Review of Systems   Constitutional:  Positive for activity change. Negative for appetite change, chills, fatigue and fever.   HENT:  Negative for nasal congestion, ear discharge, hearing loss, nosebleeds, rhinorrhea, sore throat and tinnitus.    Eyes:  Negative for pain, redness and itching.   Respiratory:  Negative for cough, choking, chest tightness and shortness of breath.    Cardiovascular:  Positive for leg swelling. Negative for chest pain and claudication.   Gastrointestinal:  Negative for abdominal distention, abdominal pain, anal bleeding, blood in stool, change in bowel habit, constipation, diarrhea, nausea, rectal pain and change in bowel habit.   Endocrine: Negative for cold intolerance and heat intolerance.   Genitourinary:  Positive for erectile dysfunction. Negative for difficulty urinating, enuresis, flank pain and hematuria.   Musculoskeletal:  Positive for arthralgias, gait problem, leg pain and joint deformity. Negative for back pain.   Allergic/Immunologic: Negative for environmental allergies and food allergies.   Neurological:  Negative for vertigo, facial asymmetry, speech difficulty, numbness, headaches, coordination difficulties and coordination difficulties.   Psychiatric/Behavioral:  Negative for  "behavioral problems, decreased concentration, dysphoric mood, hallucinations and self-injury. The patient is not nervous/anxious.    All other systems reviewed and are negative.      Current Medications:   Medication List with Changes/Refills   New Medications    APIXABAN (ELIQUIS) 5 MG TAB    Take 2 tablets (10 mg total) by mouth 2 (two) times daily. for 7 days       Start Date: 1/5/2023  End Date: 1/12/2023    APIXABAN (ELIQUIS) 5 MG TAB    Take 1 tablet (5 mg total) by mouth 2 (two) times daily.       Start Date: 1/13/2023 End Date: 7/14/2023   Current Medications    AMLODIPINE (NORVASC) 10 MG TABLET    Take 1 tablet (10 mg total) by mouth once daily.       Start Date: 12/12/2022End Date: 12/12/2023    ASPIRIN (ECOTRIN) 325 MG EC TABLET    Take 1 tablet (325 mg total) by mouth 2 (two) times daily.       Start Date: 11/30/2022End Date: 11/30/2023    ATORVASTATIN (LIPITOR) 20 MG TABLET    Take 1 tablet (20 mg total) by mouth once daily.       Start Date: 12/12/2022End Date: 12/12/2023    MELOXICAM (MOBIC) 7.5 MG TABLET    TAKE 1 TABLET BY MOUTH EVERY DAY       Start Date: 8/9/2022  End Date: --    OXYCODONE-ACETAMINOPHEN (PERCOCET) 7.5-325 MG PER TABLET    Take 1 tablet by mouth every 6 (six) hours as needed for Pain.       Start Date: 12/6/2022 End Date: --    TADALAFIL (CIALIS) 10 MG TABLET    Take 1 tablet (10 mg total) by mouth daily as needed for Erectile Dysfunction.       Start Date: 12/12/2022End Date: 2/10/2023            Objective:        Vitals:    01/05/23 1502   BP: 127/80   Pulse: 68   Resp: 18   Temp: 97.5 °F (36.4 °C)   TempSrc: Temporal   SpO2: 96%   Weight: 108.8 kg (239 lb 12.8 oz)   Height: 6' 3" (1.905 m)       Physical Exam  Vitals reviewed.   Constitutional:       General: He is awake. He is not in acute distress.     Appearance: Normal appearance. He is well-developed, well-groomed and overweight. He is not ill-appearing, toxic-appearing or diaphoretic.   HENT:      Head: Normocephalic and " atraumatic.      Nose: Nose normal.      Mouth/Throat:      Mouth: Mucous membranes are dry.      Pharynx: Oropharynx is clear.   Eyes:      Conjunctiva/sclera: Conjunctivae normal.      Pupils: Pupils are equal, round, and reactive to light.   Cardiovascular:      Rate and Rhythm: Normal rate and regular rhythm.      Pulses: Normal pulses.      Heart sounds: Normal heart sounds.   Pulmonary:      Effort: Pulmonary effort is normal.      Breath sounds: Normal breath sounds.   Abdominal:      General: Bowel sounds are normal.      Palpations: Abdomen is soft.   Musculoskeletal:         General: Swelling and tenderness present.      Left knee: Tenderness present.      Left lower leg: Tenderness present. Edema present.        Legs:    Skin:     General: Skin is warm and dry.   Neurological:      Mental Status: He is alert and oriented to person, place, and time.   Psychiatric:         Mood and Affect: Mood normal.         Behavior: Behavior normal. Behavior is cooperative.         Thought Content: Thought content normal.         Judgment: Judgment normal.           Lab Results   Component Value Date    WBC 12.80 (H) 12/01/2022    HGB 13.4 (L) 12/01/2022    HCT 41.3 12/01/2022     12/01/2022    CHOL 169 12/01/2022    TRIG 67 12/01/2022    HDL 56 12/01/2022    ALT 21 12/01/2022    AST 15 12/01/2022     (L) 12/01/2022    K 4.2 12/01/2022    CL 99 12/01/2022    CREATININE 1.05 12/01/2022    BUN 14 12/01/2022    CO2 24 12/01/2022    TSH 0.297 (L) 12/01/2022    INR 1.02 11/22/2022    INR 1.02 11/22/2022    HGBA1C 6.0 12/01/2022      Assessment:       1. Acute deep vein thrombosis (DVT) of tibial vein of left lower extremity          Plan:         Problem List Items Addressed This Visit          Hematology    Acute deep vein thrombosis (DVT) of tibial vein     DVT diagnosed 1/5/2023 on US of lower extremity  Hx of L Total Knee replacement 11/30/2022 by Dr. Reddy Ziegler  Will follow up with PCP next  week         Relevant Medications    apixaban (ELIQUIS) 5 mg Tab    apixaban (ELIQUIS) 5 mg Tab (Start on 1/13/2023)         Follow up if symptoms worsen or fail to improve.    Meliton Cervantes MD     Instructed patient that if symptoms fail to improve or worsen patient should seek immediate medical attention or report to the nearest emergency department. Patient expressed verbal agreement and understanding to this plan of care.

## 2023-01-05 NOTE — PROGRESS NOTES
CC:    Chief Complaint   Patient presents with    Post-op Evaluation     LT TKR 11/30 (5 WEEKS)           Previos History :        History:  1/5/2023   Mukesh Loera is a 57 y.o.  status post 5 weeks out left total knee  comes in today is not stockings his calf is swollen he has not been to physical therapy since sometime in December also think home health was stopped and he had to lay get into therapy      PE:   The left leg does have swelling particularly the lower leg around the calf were around the Achilles and calf region his motion is limited from about 3 to about 80° which is less than previous      Radiology:  Left knee AP and lateral views total knee prosthesis cemented components good alignment no evidence of loosening computer navigation pin sites are present        Ass/Plan:  Tone we got get back in his stocking but we will get him get an ultrasound today rule out DVT if negative and will get him on the definitely make sure he is taking aspirin a day wear stockings we got get him back going to physical therapy      Addendum patient's ultrasound came back as a non occluding thrombus of the popliteal vein we reached out to his Dr. Abdelrahman Gresham's clinic office they agreed to see him to get him started on blood thinners discussed with patient        Damir Blakely III, MD    Subject to voice recognition errors,  transcription services are not allowed

## 2023-01-05 NOTE — ASSESSMENT & PLAN NOTE
DVT diagnosed 1/5/2023 on US of lower extremity  Hx of L Total Knee replacement 11/30/2022 by Dr. Reddy Ziegler  Will follow up with PCP next week

## 2023-01-09 ENCOUNTER — CLINICAL SUPPORT (OUTPATIENT)
Dept: REHABILITATION | Facility: HOSPITAL | Age: 58
End: 2023-01-09
Payer: COMMERCIAL

## 2023-01-09 ENCOUNTER — OFFICE VISIT (OUTPATIENT)
Dept: FAMILY MEDICINE | Facility: CLINIC | Age: 58
End: 2023-01-09
Payer: COMMERCIAL

## 2023-01-09 VITALS
BODY MASS INDEX: 29.49 KG/M2 | DIASTOLIC BLOOD PRESSURE: 54 MMHG | HEART RATE: 76 BPM | OXYGEN SATURATION: 97 % | HEIGHT: 75 IN | WEIGHT: 237.19 LBS | RESPIRATION RATE: 16 BRPM | TEMPERATURE: 97 F | SYSTOLIC BLOOD PRESSURE: 128 MMHG

## 2023-01-09 DIAGNOSIS — R29.898 WEAKNESS OF LEFT LEG: ICD-10-CM

## 2023-01-09 DIAGNOSIS — Z96.652 S/P TOTAL KNEE REPLACEMENT, LEFT: ICD-10-CM

## 2023-01-09 DIAGNOSIS — M25.662 DECREASED RANGE OF MOTION (ROM) OF LEFT KNEE: Primary | ICD-10-CM

## 2023-01-09 DIAGNOSIS — M25.562 ACUTE PAIN OF LEFT KNEE: ICD-10-CM

## 2023-01-09 DIAGNOSIS — E78.5 DYSLIPIDEMIA: ICD-10-CM

## 2023-01-09 DIAGNOSIS — I10 ESSENTIAL HYPERTENSION: ICD-10-CM

## 2023-01-09 DIAGNOSIS — I82.442 ACUTE DEEP VEIN THROMBOSIS (DVT) OF TIBIAL VEIN OF LEFT LOWER EXTREMITY: ICD-10-CM

## 2023-01-09 PROCEDURE — 99212 PR OFFICE/OUTPT VISIT, EST, LEVL II, 10-19 MIN: ICD-10-PCS | Mod: GC,,, | Performed by: FAMILY MEDICINE

## 2023-01-09 PROCEDURE — 1159F MED LIST DOCD IN RCRD: CPT | Mod: ,,, | Performed by: FAMILY MEDICINE

## 2023-01-09 PROCEDURE — 3008F PR BODY MASS INDEX (BMI) DOCUMENTED: ICD-10-PCS | Mod: ,,, | Performed by: FAMILY MEDICINE

## 2023-01-09 PROCEDURE — 1159F PR MEDICATION LIST DOCUMENTED IN MEDICAL RECORD: ICD-10-PCS | Mod: ,,, | Performed by: FAMILY MEDICINE

## 2023-01-09 PROCEDURE — 3078F DIAST BP <80 MM HG: CPT | Mod: ,,, | Performed by: FAMILY MEDICINE

## 2023-01-09 PROCEDURE — 97110 THERAPEUTIC EXERCISES: CPT | Mod: CQ

## 2023-01-09 PROCEDURE — 3074F PR MOST RECENT SYSTOLIC BLOOD PRESSURE < 130 MM HG: ICD-10-PCS | Mod: ,,, | Performed by: FAMILY MEDICINE

## 2023-01-09 PROCEDURE — 3008F BODY MASS INDEX DOCD: CPT | Mod: ,,, | Performed by: FAMILY MEDICINE

## 2023-01-09 PROCEDURE — 97112 NEUROMUSCULAR REEDUCATION: CPT | Mod: CQ

## 2023-01-09 PROCEDURE — 99212 OFFICE O/P EST SF 10 MIN: CPT | Mod: GC,,, | Performed by: FAMILY MEDICINE

## 2023-01-09 PROCEDURE — 3078F PR MOST RECENT DIASTOLIC BLOOD PRESSURE < 80 MM HG: ICD-10-PCS | Mod: ,,, | Performed by: FAMILY MEDICINE

## 2023-01-09 PROCEDURE — 3074F SYST BP LT 130 MM HG: CPT | Mod: ,,, | Performed by: FAMILY MEDICINE

## 2023-01-09 RX ORDER — ASPIRIN 81 MG/1
81 TABLET ORAL DAILY
Qty: 90 TABLET | Refills: 3 | Status: SHIPPED | OUTPATIENT
Start: 2023-01-09 | End: 2024-02-12 | Stop reason: SDUPTHER

## 2023-01-09 NOTE — PROGRESS NOTES
[facility]   Physical Therapy Treatment Note     Name: Jonathan Loera  Clinic Number: [id number]    Visit Date: 1/9/2023    Therapy Diagnosis: Left TKA  Physician: Damir Blakely III, MD     Physician Orders: PT Eval and Treat   Medical Diagnosis from Referral: Left TKA  Evaluation Date: 1/4/2023  DOS : 11/30/2022  Updated Plan of Care Due : 2/3/2023  Authorization Period Expiration: 12/19/2023  Plan of Care Expiration: 3/3/2023    Visit # / Visits authorized: 1 / 18  PTA Visit #: 1/5     Time In: 11:32 am  Time Out: 12:21 pm  Total Billable Time: 49 minutes    SUBJECTIVE     Pt reports: no pain just a lot of soreness. Saw MD and is positive for thrombus in tibial vein  He was compliant with home exercise program.  Response to previous treatment: First since eval  Functional change: None    Pain: 1/10  Location: right knee      Prior Level of Function: Independent with his job  Current Level of Function: Requires use of walker for ambulation.     OBJECTIVE     Objective Measures updated at progress report unless specified.     Passive Flexion: 96  Active Extension: -10  Passive Extension:     Treatment     JONATHAN received the treatments listed below:      therapeutic exercises to develop strength, endurance, and ROM for 30 minutes including:  Bike/NuStep x 5 minutes  SB 4x20 seconds  Hamstring Stretch on Stairs 4x20 seconds  Knee Flexion Stretch on Stairs 4x20 seconds  Heelslides with Belt 10x5 sex hold     Supine :   Quad Sets 40x  Heel Slides 20x  Short Arc Quads   Straight Leg Raises 30x  Hip Abduction   Bridging      Sitting :  Marching   TKEs 4 plates 30x  Hamstring Curls   Hip Abd/Add  Ankle Pumps      Standing :  Standing Marches  Squat to chair/Shallow standing knee bends  Hip Abduction   Heel Raises 30x     Single Leg Stance - Firm   Single Leg Stance - Foam     manual therapy techniques: Joint mobilizations, Manual traction, and Myofacial release were applied to the:  knee for 10 minutes, including:  Manual Flexion  Manual Extension  Patellar Mobs    neuromuscular re-education activities to improve: Balance, Coordination, Kinesthetic, and Proprioception for 0 minutes. The following activities were included:      therapeutic activities to improve functional performance for 0  minutes, including:      gait training to improve functional mobility and safety for 0  minutes, including:      Patient Education and Home Exercises     Home Exercises Provided and Patient Education Provided     Education provided:   - None    Written Home Exercises Provided: Patient instructed to cont prior HEP. Exercises were reviewed and MUKESH was able to demonstrate them prior to the end of the session.  MUKESH demonstrated good  understanding of the education provided. See EMR under Patient Instructions for exercises provided during therapy sessions    ASSESSMENT     Pt tolerated all therapeutic exercises & manual ROM today with mild c/o pain noted. ROM very stiff and guarded as noted in objective. Pt has moderate loss of extension ROM. Pt has mild extensor lag with SLR. Progressed exercises with main goal of restoring ROM and working on quad control. Educated pt to keep up with his HEP diligently.        PMH:    Mukesh is a 57 y.o. male referred to outpatient Physical Therapy with a medical diagnosis of Left TKA. MUKESH reports: He had severe OA in the Left Knee for years and subsequently underwent a Left TKA on 11/30/2022. He has been receiving Home Health Physical Therapy but Dr Blakely is worried about his Range of Motion and has referred patient to begin Outpatient Physical Therapy.   Patient presents with decreased Left knee Range of Motion with Flexion limited to 80 degrees and Extension at -10 degrees from zero  at time of Evaluation. Patient keeps knee flexed at approx 60 degrees most of the time as this appears to be his position of comfort. Patient is unable to flex the knee to 90 degrees and therefore  sit to stand is difficult. He requires use of arms and additional time to perform sit to stand. Patient requires use of a rolling walker for ambulation. Patient is noted to have decreased stance time and antalgic gait on the Left. Patient has decreased heel strike and hits foot flat due to lack of Full Knee Extension with initial contact on the Left. Patient has decreased step/stride length resulting in slow eliana. A Home Exercise Program was established today. All exercises were reviewed with the patient but this will need to be reinforced due to decreased health literacy. Patient will benefit from skilled Physical Therapy intervention to address all deficits and to help him return to his prior level of function.       JONATHAN Is progressing towards his goals.   Pt prognosis is Good.     Pt will continue to benefit from skilled outpatient physical therapy to address the deficits listed in the problem list box on initial evaluation, provide pt/family education and to maximize pt's level of independence in the home and community environment.     Pt's spiritual, cultural and educational needs considered and pt agreeable to plan of care and goals.     Anticipated barriers to physical therapy: None    Goals: Short Term Goals: 4 weeks   1. Independent with Home Exercise Program   2. Increase Left Knee Range of Motion to 0 Degrees to 100 Degrees  3. Increase Left Knee Strength to grossly 4/5  4. Patient will ambulate 500 feet without an assistive device with complaints of pain Less than or Equal to 4/10.       Long Term Goals: 8 weeks   1. Patient will increase Left Knee Strength to grossly 4+/5  2. Patient will increase Left Knee Range of Motion to 0 Degrees to 120 Degrees   3. Patient will ambulate 1000+ feet with step length more equal from Left to Right and with complaints of pain Less than or Equal to 2/10.     PLAN     Plan of care Certification: 1/4/2023 to 3/3/2023.     Outpatient Physical Therapy 2 times weekly for  8 weeks to include the following interventions: Electrical Stimulation to increase strength and decrease pain and inflammation as needed, Gait Training, Manual Therapy, Moist Heat/ Ice, Neuromuscular Re-ed, Patient Education, Therapeutic Activities, and Therapeutic Exercise.        Meliton Damian, PTA   01/09/2023

## 2023-01-09 NOTE — PROGRESS NOTES
Subjective:       Patient ID: Mukesh Loera is a 57 y.o. male.    Chief Complaint: Follow-up (1 MONTH FOLLOW UP FOR HTN )    This is a 57 y.o male who presents today for a HTN and thrombosis follow up. Patient was seen in clinic 1 week ago and was found to have a DVT of his Tibial Vein patient was started on Eliquis. Patient is 1 month post LTKR. He states today that his left ankle and leg swelling has decreased since starting the Eliquis and his leg feels much better. Pts blood pressure today  is 128/54 he denies HA, fever, fatigue, SOB or chest pain. HE is still using crutches and participating in physical therapy.     Plan for today is to have patient start taking aspirin 81mg daily and continue his Eliquis. We will follow up in 3 months and assess to see if he needs to continue the Eliquis.          Current Outpatient Medications:     amLODIPine (NORVASC) 10 MG tablet, Take 1 tablet (10 mg total) by mouth once daily., Disp: 90 tablet, Rfl: 3    apixaban (ELIQUIS) 5 mg Tab, Take 2 tablets (10 mg total) by mouth 2 (two) times daily. for 7 days, Disp: 28 tablet, Rfl: 0    [START ON 1/13/2023] apixaban (ELIQUIS) 5 mg Tab, Take 1 tablet (5 mg total) by mouth 2 (two) times daily., Disp: 60 tablet, Rfl: 6    atorvastatin (LIPITOR) 20 MG tablet, Take 1 tablet (20 mg total) by mouth once daily., Disp: 90 tablet, Rfl: 3    meloxicam (MOBIC) 7.5 MG tablet, TAKE 1 TABLET BY MOUTH EVERY DAY, Disp: 30 tablet, Rfl: 1    oxyCODONE-acetaminophen (PERCOCET) 7.5-325 mg per tablet, Take 1 tablet by mouth every 6 (six) hours as needed for Pain., Disp: 20 tablet, Rfl: 0    tadalafiL (CIALIS) 10 MG tablet, Take 1 tablet (10 mg total) by mouth daily as needed for Erectile Dysfunction., Disp: 30 tablet, Rfl: 1    aspirin (ECOTRIN) 81 MG EC tablet, Take 1 tablet (81 mg total) by mouth once daily., Disp: 90 tablet, Rfl: 3    Review of patient's allergies indicates:  No Known Allergies    Past Medical History:   Diagnosis Date     Hypertension        Past Surgical History:   Procedure Laterality Date    ARTHROPLASTY, KNEE, TOTAL, USING COMPUTER-ASSISTED NAVIGATION Left 11/30/2022    Procedure: ARTHROPLASTY, KNEE, TOTAL, USING COMPUTER-ASSISTED NAVIGATION;  Surgeon: Damir Blakely III, MD;  Location: [facility];  Service: Orthopedics;  Laterality: Left;       Family History   Problem Relation Age of Onset    Hypertension Mother        Social History     Tobacco Use    Smoking status: Never    Smokeless tobacco: Never   Substance Use Topics    Alcohol use: Yes    Drug use: Not Currently     Types: Marijuana       Review of Systems   Constitutional:  Negative for fatigue and fever.   HENT: Negative.     Eyes: Negative.    Respiratory:  Negative for cough and shortness of breath.    Cardiovascular:  Positive for leg swelling.   Gastrointestinal:  Negative for abdominal distention, abdominal pain, anal bleeding, constipation and diarrhea.   Endocrine: Negative.    Genitourinary:  Negative for bladder incontinence and difficulty urinating.   Musculoskeletal:  Positive for arthralgias and joint swelling.   Integumentary:  Negative.   Allergic/Immunologic: Negative.    Neurological: Negative.    Hematological: Negative.    Psychiatric/Behavioral: Negative.         Current Medications:   Medication List with Changes/Refills   New Medications    ASPIRIN (ECOTRIN) 81 MG EC TABLET    Take 1 tablet (81 mg total) by mouth once daily.       Start Date: 1/9/2023  End Date: 1/9/2024   Current Medications    AMLODIPINE (NORVASC) 10 MG TABLET    Take 1 tablet (10 mg total) by mouth once daily.       Start Date: 12/12/2022End Date: 12/12/2023    APIXABAN (ELIQUIS) 5 MG TAB    Take 2 tablets (10 mg total) by mouth 2 (two) times daily. for 7 days       Start Date: 1/5/2023  End Date: 1/12/2023    APIXABAN (ELIQUIS) 5 MG TAB    Take 1 tablet (5 mg total) by mouth 2 (two) times daily.       Start Date: 1/13/2023 End Date: 7/14/2023    ATORVASTATIN (LIPITOR)  "20 MG TABLET    Take 1 tablet (20 mg total) by mouth once daily.       Start Date: 12/12/2022End Date: 12/12/2023    MELOXICAM (MOBIC) 7.5 MG TABLET    TAKE 1 TABLET BY MOUTH EVERY DAY       Start Date: 8/9/2022  End Date: --    OXYCODONE-ACETAMINOPHEN (PERCOCET) 7.5-325 MG PER TABLET    Take 1 tablet by mouth every 6 (six) hours as needed for Pain.       Start Date: 12/6/2022 End Date: --    TADALAFIL (CIALIS) 10 MG TABLET    Take 1 tablet (10 mg total) by mouth daily as needed for Erectile Dysfunction.       Start Date: 12/12/2022End Date: 2/10/2023   Discontinued Medications    ASPIRIN (ECOTRIN) 325 MG EC TABLET    Take 1 tablet (325 mg total) by mouth 2 (two) times daily.       Start Date: 11/30/2022End Date: 1/9/2023            Objective:        Vitals:    01/09/23 0826   BP: (!) 128/54   Pulse: 76   Resp: 16   Temp: 97.2 °F (36.2 °C)   TempSrc: Temporal   SpO2: 97%   Weight: 107.6 kg (237 lb 3.2 oz)   Height: 6' 3" (1.905 m)       Physical Exam  Constitutional:       Appearance: He is obese.   HENT:      Head: Normocephalic.      Right Ear: Tympanic membrane normal.      Left Ear: Tympanic membrane normal.      Nose: Nose normal.      Mouth/Throat:      Mouth: Mucous membranes are dry.      Pharynx: Oropharynx is clear.   Eyes:      Conjunctiva/sclera: Conjunctivae normal.      Pupils: Pupils are equal, round, and reactive to light.   Cardiovascular:      Rate and Rhythm: Normal rate and regular rhythm.      Pulses: Normal pulses.      Heart sounds: Normal heart sounds.   Pulmonary:      Effort: Pulmonary effort is normal.      Breath sounds: Normal breath sounds.   Abdominal:      General: Abdomen is flat. Bowel sounds are normal.      Palpations: Abdomen is soft.   Musculoskeletal:         General: Tenderness present.      Left lower leg: Edema present.   Skin:     General: Skin is warm and dry.      Capillary Refill: Capillary refill takes less than 2 seconds.   Neurological:      Mental Status: He is alert " and oriented to person, place, and time.   Psychiatric:         Mood and Affect: Mood normal.           Lab Results   Component Value Date    WBC 12.80 (H) 12/01/2022    HGB 13.4 (L) 12/01/2022    HCT 41.3 12/01/2022     12/01/2022    CHOL 169 12/01/2022    TRIG 67 12/01/2022    HDL 56 12/01/2022    ALT 21 12/01/2022    AST 15 12/01/2022     (L) 12/01/2022    K 4.2 12/01/2022    CL 99 12/01/2022    CREATININE 1.05 12/01/2022    BUN 14 12/01/2022    CO2 24 12/01/2022    TSH 0.297 (L) 12/01/2022    INR 1.02 11/22/2022    INR 1.02 11/22/2022    HGBA1C 6.0 12/01/2022      Assessment:       1. Essential hypertension    2. Dyslipidemia    3. Acute deep vein thrombosis (DVT) of tibial vein of left lower extremity        Plan:         Problem List Items Addressed This Visit          Cardiac/Vascular    Essential hypertension     Continue Norvasc 10 mg         Dyslipidemia     Continue Lipitor 20 mg            Hematology    Acute deep vein thrombosis (DVT) of tibial vein     Eliquis 10 mg then to Eliquis 5 mg              Follow up in about 3 months (around 4/9/2023) for follow up eliquis need and TSH and Hgb A1c.    Abdelrahman Winslow MD     Instructed patient that if symptoms fail to improve or worsen patient should seek immediate medical attention or report to the nearest emergency department. Patient expressed verbal agreement and understanding to this plan of care.

## 2023-01-11 ENCOUNTER — CLINICAL SUPPORT (OUTPATIENT)
Dept: REHABILITATION | Facility: HOSPITAL | Age: 58
End: 2023-01-11
Payer: COMMERCIAL

## 2023-01-11 DIAGNOSIS — M25.662 DECREASED RANGE OF MOTION (ROM) OF LEFT KNEE: Primary | ICD-10-CM

## 2023-01-11 DIAGNOSIS — R29.898 WEAKNESS OF LEFT LEG: ICD-10-CM

## 2023-01-11 DIAGNOSIS — Z96.652 S/P TOTAL KNEE REPLACEMENT, LEFT: ICD-10-CM

## 2023-01-11 DIAGNOSIS — M25.562 ACUTE PAIN OF LEFT KNEE: ICD-10-CM

## 2023-01-11 PROCEDURE — 97112 NEUROMUSCULAR REEDUCATION: CPT | Mod: CQ

## 2023-01-11 PROCEDURE — 97110 THERAPEUTIC EXERCISES: CPT | Mod: CQ

## 2023-01-11 NOTE — PROGRESS NOTES
[facility]   Physical Therapy Treatment Note     Name: Jonathan Loera  Clinic Number: [id number]    Visit Date: 1/11/2023    Therapy Diagnosis: Left TKA  Physician: Damir Blakely III, MD     Physician Orders: PT Eval and Treat   Medical Diagnosis from Referral: Left TKA  Evaluation Date: 1/4/2023  DOS : 11/30/2022  Updated Plan of Care Due : 2/3/2023  Authorization Period Expiration: 12/19/2023  Plan of Care Expiration: 3/3/2023    Visit # / Visits authorized: 3 / 18  PTA Visit #: 2/5     Time In: 11:25 am  Time Out: 12:15 pm  Total Billable Time: 50 minutes    SUBJECTIVE     Pt reports: he is doing well today  He was compliant with home exercise program.  Response to previous treatment: First since eval  Functional change: None    Pain: 1/10  Location: right knee      Prior Level of Function: Independent with his job  Current Level of Function: Requires use of walker for ambulation.     OBJECTIVE     Objective Measures updated at progress report unless specified.     Active Flexion: 89  Passive Flexion: 98  Active Extension: -12  Passive Extension: -8    Treatment     JONATHAN received the treatments listed below:      therapeutic exercises to develop strength, endurance, and ROM for 30 minutes including:  Bike/NuStep x 5 minutes  SB 4x20 seconds  Hamstring Stretch on Stairs 4x20 seconds  Knee Flexion Stretch on Stairs 4x20 seconds  Heelslides with Belt 10x5 sex hold     Supine :   Quad Sets 40x  Heel Slides 20x  Short Arc Quads with Belt - 20x10 sec hold  Straight Leg Raises 30x  Hip Abduction   Bridging      Sitting :  Marching   TKEs 4 plates 30x  Hamstring Curls   Hip Abd/Add  Ankle Pumps      Standing :  Standing Marches  Squat to chair/Shallow standing knee bends  Hip Abduction   Heel Raises 30x     Single Leg Stance - Firm   Single Leg Stance - Foam     manual therapy techniques: Joint mobilizations, Manual traction, and Myofacial release were applied to the: knee for 10  minutes, including:  Manual Flexion  Manual Extension  Patellar Mobs    neuromuscular re-education activities to improve: Balance, Coordination, Kinesthetic, and Proprioception for 0 minutes. The following activities were included:      therapeutic activities to improve functional performance for 0  minutes, including:      gait training to improve functional mobility and safety for 0  minutes, including:      Patient Education and Home Exercises     Home Exercises Provided and Patient Education Provided     Education provided:   - None    Written Home Exercises Provided: Patient instructed to cont prior HEP. Exercises were reviewed and MUKESH was able to demonstrate them prior to the end of the session.  MUKESH demonstrated good  understanding of the education provided. See EMR under Patient Instructions for exercises provided during therapy sessions    ASSESSMENT     Pt tolerated all therapeutic exercises and manual ROM today with mild pain noted. Pt ambulates with moderate loss of knee extension. Virtually no change in either flexion/extension today. Pt has mild extensor lag with SLR. Main focus of tx today was on ROM and pt education of importance of doing his HEP diligently.         PMH:    Mukesh is a 57 y.o. male referred to outpatient Physical Therapy with a medical diagnosis of Left TKA. RAY reports: He had severe OA in the Left Knee for years and subsequently underwent a Left TKA on 11/30/2022. He has been receiving Home Health Physical Therapy but Dr Blakely is worried about his Range of Motion and has referred patient to begin Outpatient Physical Therapy.   Patient presents with decreased Left knee Range of Motion with Flexion limited to 80 degrees and Extension at -10 degrees from zero  at time of Evaluation. Patient keeps knee flexed at approx 60 degrees most of the time as this appears to be his position of comfort. Patient is unable to flex the knee to 90 degrees and therefore sit to stand is difficult. He  requires use of arms and additional time to perform sit to stand. Patient requires use of a rolling walker for ambulation. Patient is noted to have decreased stance time and antalgic gait on the Left. Patient has decreased heel strike and hits foot flat due to lack of Full Knee Extension with initial contact on the Left. Patient has decreased step/stride length resulting in slow eliana. A Home Exercise Program was established today. All exercises were reviewed with the patient but this will need to be reinforced due to decreased health literacy. Patient will benefit from skilled Physical Therapy intervention to address all deficits and to help him return to his prior level of function.       JONATHAN Is progressing towards his goals.   Pt prognosis is Good.     Pt will continue to benefit from skilled outpatient physical therapy to address the deficits listed in the problem list box on initial evaluation, provide pt/family education and to maximize pt's level of independence in the home and community environment.     Pt's spiritual, cultural and educational needs considered and pt agreeable to plan of care and goals.     Anticipated barriers to physical therapy: None    Goals: Short Term Goals: 4 weeks   1. Independent with Home Exercise Program   2. Increase Left Knee Range of Motion to 0 Degrees to 100 Degrees  3. Increase Left Knee Strength to grossly 4/5  4. Patient will ambulate 500 feet without an assistive device with complaints of pain Less than or Equal to 4/10.       Long Term Goals: 8 weeks   1. Patient will increase Left Knee Strength to grossly 4+/5  2. Patient will increase Left Knee Range of Motion to 0 Degrees to 120 Degrees   3. Patient will ambulate 1000+ feet with step length more equal from Left to Right and with complaints of pain Less than or Equal to 2/10.     PLAN     Plan of care Certification: 1/4/2023 to 3/3/2023.     Outpatient Physical Therapy 2 times weekly for 8 weeks to include the  following interventions: Electrical Stimulation to increase strength and decrease pain and inflammation as needed, Gait Training, Manual Therapy, Moist Heat/ Ice, Neuromuscular Re-ed, Patient Education, Therapeutic Activities, and Therapeutic Exercise.        Meliton Damian, PTA   01/11/2023

## 2023-01-18 ENCOUNTER — CLINICAL SUPPORT (OUTPATIENT)
Dept: REHABILITATION | Facility: HOSPITAL | Age: 58
End: 2023-01-18
Payer: COMMERCIAL

## 2023-01-18 DIAGNOSIS — R29.898 WEAKNESS OF LEFT LEG: ICD-10-CM

## 2023-01-18 DIAGNOSIS — Z96.652 S/P TOTAL KNEE REPLACEMENT, LEFT: ICD-10-CM

## 2023-01-18 DIAGNOSIS — M25.562 ACUTE PAIN OF LEFT KNEE: ICD-10-CM

## 2023-01-18 DIAGNOSIS — M25.662 DECREASED RANGE OF MOTION (ROM) OF LEFT KNEE: Primary | ICD-10-CM

## 2023-01-18 PROCEDURE — 97016 VASOPNEUMATIC DEVICE THERAPY: CPT

## 2023-01-18 PROCEDURE — 97110 THERAPEUTIC EXERCISES: CPT

## 2023-01-18 PROCEDURE — 97140 MANUAL THERAPY 1/> REGIONS: CPT

## 2023-01-18 NOTE — PROGRESS NOTES
[facility]   Physical Therapy Treatment Note     Name: Mukesh Loera  Clinic Number: [id number]    Visit Date: 1/18/2023    Therapy Diagnosis: Left TKA  Physician: Damir Blakely III, MD     Physician Orders: PT Eval and Treat   Medical Diagnosis from Referral: Left TKA  Evaluation Date: 1/4/2023  DOS : 11/30/2022  Updated Plan of Care Due : 2/3/2023  Authorization Period Expiration: 12/19/2023  Plan of Care Expiration: 3/3/2023    Visit # / Visits authorized: 4 / 18  PTA Visit #: 2/5     Time In: 10:50 am  Time Out: 12:00 pm  Total Billable Time: 60 minutes    SUBJECTIVE     Pt reports: he has been trying some of the Exercises and stretches at home  He was compliant with home exercise program.  Response to previous treatment: Soreness   Functional change: He is on crutches now rather than a walker    Pain: 1/10 at rest; 3/10 with Exercises   Location: right knee      Prior Level of Function: Independent with his job  Current Level of Function: Requires use of walker for ambulation.     OBJECTIVE     Objective Measures updated at progress report unless specified.     Active Flexion: 89  Passive Flexion: 98  Active Extension: -12  Passive Extension: -8    Treatment     RAY received the treatments listed below:      therapeutic exercises to develop strength, endurance, and ROM for 30 minutes including:  Bike/NuStep x 5 minutes  SB 4x20 seconds  Hamstring Stretch on Stairs 4x20 seconds  Knee Flexion Stretch on Stairs 4x20 seconds  Heelslides with Belt 10x5 sex hold    Exercises Added on 1/18/2023 :   Cybex Leg Press Bilateral 3 x 10 with 8 Plates   Cybex Leg Press Left Only 3 x 10 with 5 Plates   Cybex Knee Extension 3 x 10 with 3 Plates   Cybex Hamstring Curls 3 x 10 with 4 Plates      Supine :   Quad Sets 40x  Heel Slides 20x  Short Arc Quads with Belt - 20x10 sec hold  Straight Leg Raises 30x  Hip Abduction   Bridging      Sitting :  Marching   TKEs 4 plates 30x  Hamstring  Curls   Hip Abd/Add  Ankle Pumps      Standing :  Standing Marches  Squat to chair/Shallow standing knee bends  Hip Abduction   Heel Raises 30x     Single Leg Stance - Firm   Single Leg Stance - Foam     manual therapy techniques: Joint mobilizations, Manual traction, and Myofacial release were applied to the: knee for 15 minutes, including:  Manual Flexion  Manual Extension  Patellar Mobs  Prone Knee Flexion stretch with strap 5 x 30 sec hold     neuromuscular re-education activities to improve: Balance, Coordination, Kinesthetic, and Proprioception for 0 minutes. The following activities were included:      therapeutic activities to improve functional performance for 0  minutes, including:      gait training to improve functional mobility and safety for 0  minutes, including:    Patient received the following supervised modalities after being cleared for contradictions: Pre-Mod Electrical Stimulation:  Patient received Pre-Mod Electrical Stimulation for pain control applied to the Left Knee. Pt received stimulation at a frequency of  Hz for 15 minutes. Patient tolerated treatment well without any adverse effects.       Patient received Vasopneumatic pump via Game Ready to Left Knee x 15 Min       Patient Education and Home Exercises     Home Exercises Provided and Patient Education Provided     Education provided:   - None    Written Home Exercises Provided: Patient instructed to cont prior HEP. Exercises were reviewed and JONATHAN was able to demonstrate them prior to the end of the session.  JONATHAN demonstrated good  understanding of the education provided. See EMR under Patient Instructions for exercises provided during therapy sessions    ASSESSMENT     Therapist upgraded his Plan of Care today to include Cybex Machines. He tolerated this well. Patient is very guarded with all movement of the Left Knee. His   Passive Range of Motion is limited to -8 degrees extension and 98 degrees flexion with max over pressure  from the Therapist. Therapist had patient to Prone Knee Flexion stretch with strap for over-pressure. Cues to keep patient in proper alignment during stretches as he moves his hips in order to lessen the stretch. Will continue to work with patient to increase strength and Range of Motion.             PMH:    Jonathan is a 57 y.o. male referred to outpatient Physical Therapy with a medical diagnosis of Left TKA. JONATHAN reports: He had severe OA in the Left Knee for years and subsequently underwent a Left TKA on 11/30/2022. He has been receiving Home Health Physical Therapy but Dr Blakely is worried about his Range of Motion and has referred patient to begin Outpatient Physical Therapy.   Patient presents with decreased Left knee Range of Motion with Flexion limited to 80 degrees and Extension at -10 degrees from zero  at time of Evaluation. Patient keeps knee flexed at approx 60 degrees most of the time as this appears to be his position of comfort. Patient is unable to flex the knee to 90 degrees and therefore sit to stand is difficult. He requires use of arms and additional time to perform sit to stand. Patient requires use of a rolling walker for ambulation. Patient is noted to have decreased stance time and antalgic gait on the Left. Patient has decreased heel strike and hits foot flat due to lack of Full Knee Extension with initial contact on the Left. Patient has decreased step/stride length resulting in slow eliana. A Home Exercise Program was established today. All exercises were reviewed with the patient but this will need to be reinforced due to decreased health literacy. Patient will benefit from skilled Physical Therapy intervention to address all deficits and to help him return to his prior level of function.       JONATHAN Is progressing towards his goals.   Pt prognosis is Good.     Pt will continue to benefit from skilled outpatient physical therapy to address the deficits listed in the problem list box on initial  evaluation, provide pt/family education and to maximize pt's level of independence in the home and community environment.     Pt's spiritual, cultural and educational needs considered and pt agreeable to plan of care and goals.     Anticipated barriers to physical therapy: None    Goals: Short Term Goals: 4 weeks   1. Independent with Home Exercise Program   2. Increase Left Knee Range of Motion to 0 Degrees to 100 Degrees  3. Increase Left Knee Strength to grossly 4/5  4. Patient will ambulate 500 feet without an assistive device with complaints of pain Less than or Equal to 4/10.       Long Term Goals: 8 weeks   1. Patient will increase Left Knee Strength to grossly 4+/5  2. Patient will increase Left Knee Range of Motion to 0 Degrees to 120 Degrees   3. Patient will ambulate 1000+ feet with step length more equal from Left to Right and with complaints of pain Less than or Equal to 2/10.     PLAN     Plan of care Certification: 1/4/2023 to 3/3/2023.     Outpatient Physical Therapy 2 times weekly for 8 weeks to include the following interventions: Electrical Stimulation to increase strength and decrease pain and inflammation as needed, Gait Training, Manual Therapy, Moist Heat/ Ice, Neuromuscular Re-ed, Patient Education, Therapeutic Activities, and Therapeutic Exercise.        MARLENE RICHEY, PT, DPT   01/18/2023

## 2023-01-23 ENCOUNTER — CLINICAL SUPPORT (OUTPATIENT)
Dept: REHABILITATION | Facility: HOSPITAL | Age: 58
End: 2023-01-23
Payer: COMMERCIAL

## 2023-01-23 DIAGNOSIS — M25.662 DECREASED RANGE OF MOTION (ROM) OF LEFT KNEE: Primary | ICD-10-CM

## 2023-01-23 DIAGNOSIS — M25.562 ACUTE PAIN OF LEFT KNEE: ICD-10-CM

## 2023-01-23 DIAGNOSIS — R29.898 WEAKNESS OF LEFT LEG: ICD-10-CM

## 2023-01-23 DIAGNOSIS — Z96.652 S/P TOTAL KNEE REPLACEMENT, LEFT: ICD-10-CM

## 2023-01-23 PROCEDURE — 97140 MANUAL THERAPY 1/> REGIONS: CPT

## 2023-01-23 PROCEDURE — 97016 VASOPNEUMATIC DEVICE THERAPY: CPT

## 2023-01-23 PROCEDURE — 97110 THERAPEUTIC EXERCISES: CPT

## 2023-01-23 NOTE — PROGRESS NOTES
"[facility]   Physical Therapy Treatment Note     Name: Mukesh Loera  Clinic Number: [id number]    Visit Date: 1/23/2023    Therapy Diagnosis: Left TKA  Physician: Damir Blakely III, MD     Physician Orders: PT Eval and Treat   Medical Diagnosis from Referral: Left TKA  Evaluation Date: 1/4/2023  DOS : 11/30/2022  Authorization Period Expiration: 12/19/2023  Plan of Care Expiration: 3/3/2023    Visit # / Visits authorized: 5 / 18  PTA Visit #: 2/5     Time In: 10:55 am  Time Out: 12:00 pm  Total Billable Time: 60 minutes    SUBJECTIVE     Pt reports: "I have been stretching a little bit at home."  He was compliant with home exercise program.  Response to previous treatment: Soreness   Functional change: He is on crutches now rather than a walker    Pain: 1/10 at rest; 4-5/10 with Stretches   Location: right knee      Prior Level of Function: Independent with his job  Current Level of Function: Requires use of walker for ambulation.     OBJECTIVE     Objective Measures updated at progress report unless specified.     Active Flexion: 90  Passive Flexion: 100  Active Extension: -10  Passive Extension: -5    Treatment     RAY received the treatments listed below:      therapeutic exercises to develop strength, endurance, and ROM for 30 minutes including:  Bike/NuStep x 5 minutes  SB 4x20 seconds  Hamstring Stretch on Stairs 4x20 seconds  Knee Flexion Stretch on Stairs 4x20 seconds  Heelslides with Belt 10x5 sex hold    Exercises Added on 1/18/2023 :   Cybex Leg Press Bilateral 3 x 10 with 10 Plates   Cybex Leg Press Left Only 3 x 10 with 5 Plates   Cybex Knee Extension 3 x 10 with 3 Plates   Cybex Hamstring Curls 3 x 10 with 5 Plates      Supine :   Quad Sets 40x  Heel Slides 20x  Short Arc Quads with Belt - 20x10 sec hold  Straight Leg Raises 30x  Hip Abduction   Bridging      Sitting :  Marching   TKEs 4 plates 30x  Hamstring Curls   Hip Abd/Add  Ankle Pumps      Standing " ":  Standing Marches  Squat to chair/Shallow standing knee bends  Hip Abduction   Heel Raises 30x     Single Leg Stance - Firm   Single Leg Stance - Foam     manual therapy techniques: Joint mobilizations, Manual traction, and Myofacial release were applied to the: knee for 15 minutes, including:  Manual Flexion  Manual Extension  Patellar Mobs  Prone Knee Flexion stretch with strap 5 x 30 sec hold     neuromuscular re-education activities to improve: Balance, Coordination, Kinesthetic, and Proprioception for 0 minutes. The following activities were included:      therapeutic activities to improve functional performance for 0  minutes, including:      gait training to improve functional mobility and safety for 0  minutes, including:    Patient received the following supervised modalities after being cleared for contradictions: Pre-Mod Electrical Stimulation:  Patient received Pre-Mod Electrical Stimulation for pain control applied to the Left Knee. Pt received stimulation at a frequency of  Hz for 15 minutes. Patient tolerated treatment well without any adverse effects.       Patient received Vasopneumatic pump via Game Ready to Left Knee x 15 Min       Patient Education and Home Exercises     Home Exercises Provided and Patient Education Provided     Education provided:   - None    Written Home Exercises Provided: Patient instructed to cont prior HEP. Exercises were reviewed and JONATHAN was able to demonstrate them prior to the end of the session.  JONATHAN demonstrated good  understanding of the education provided. See EMR under Patient Instructions for exercises provided during therapy sessions    ASSESSMENT     Patient reports he is "stretching a little bit at home." Therapist stressed the importance of stretching multiple times a day at home as we are struggling to get flexion beyond 90 degrees. He is able to get to 90 degrees on his own but only while stretching on the stairs or on the Leg Press. Therapist " achieved max knee flexion of 100 degrees today but patient was extremely guarded and very vocal with stretching. Therapist was able to increase weight on Cybex Leg Press and Cybex Hamstring Curls today. Ended session with Pre-Mod and E Stim to Left Knee. He was placed in Propped Knee Extension during the E Stim to work toward 0 degrees. He constantly tries to externally rotate the leg to allow more flexion. Therapist had to keep patient in the neutral position.  His strength is progressing, but his Range of Motion remains very limited. We will continue to work with patient                 PMH:    Jonathan is a 57 y.o. male referred to outpatient Physical Therapy with a medical diagnosis of Left TKA. JONATHAN reports: He had severe OA in the Left Knee for years and subsequently underwent a Left TKA on 11/30/2022. He has been receiving Home Health Physical Therapy but Dr Blakely is worried about his Range of Motion and has referred patient to begin Outpatient Physical Therapy.   Patient presents with decreased Left knee Range of Motion with Flexion limited to 80 degrees and Extension at -10 degrees from zero  at time of Evaluation. Patient keeps knee flexed at approx 60 degrees most of the time as this appears to be his position of comfort. Patient is unable to flex the knee to 90 degrees and therefore sit to stand is difficult. He requires use of arms and additional time to perform sit to stand. Patient requires use of a rolling walker for ambulation. Patient is noted to have decreased stance time and antalgic gait on the Left. Patient has decreased heel strike and hits foot flat due to lack of Full Knee Extension with initial contact on the Left. Patient has decreased step/stride length resulting in slow eliana. A Home Exercise Program was established today. All exercises were reviewed with the patient but this will need to be reinforced due to decreased health literacy. Patient will benefit from skilled Physical Therapy  intervention to address all deficits and to help him return to his prior level of function.       RAY Is progressing towards his goals.   Pt prognosis is Good.     Pt will continue to benefit from skilled outpatient physical therapy to address the deficits listed in the problem list box on initial evaluation, provide pt/family education and to maximize pt's level of independence in the home and community environment.     Pt's spiritual, cultural and educational needs considered and pt agreeable to plan of care and goals.     Anticipated barriers to physical therapy: None    Goals: Short Term Goals: 4 weeks   1. Independent with Home Exercise Program   2. Increase Left Knee Range of Motion to 0 Degrees to 100 Degrees  3. Increase Left Knee Strength to grossly 4/5  4. Patient will ambulate 500 feet without an assistive device with complaints of pain Less than or Equal to 4/10.       Long Term Goals: 8 weeks   1. Patient will increase Left Knee Strength to grossly 4+/5  2. Patient will increase Left Knee Range of Motion to 0 Degrees to 120 Degrees   3. Patient will ambulate 1000+ feet with step length more equal from Left to Right and with complaints of pain Less than or Equal to 2/10.     PLAN     Plan of care Certification: 1/4/2023 to 3/3/2023.     Outpatient Physical Therapy 2 times weekly for 8 weeks to include the following interventions: Electrical Stimulation to increase strength and decrease pain and inflammation as needed, Gait Training, Manual Therapy, Moist Heat/ Ice, Neuromuscular Re-ed, Patient Education, Therapeutic Activities, and Therapeutic Exercise.        MARLENE RICHEY, PT, DPT   01/23/2023

## 2023-01-25 ENCOUNTER — CLINICAL SUPPORT (OUTPATIENT)
Dept: REHABILITATION | Facility: HOSPITAL | Age: 58
End: 2023-01-25
Payer: COMMERCIAL

## 2023-01-25 DIAGNOSIS — R29.898 WEAKNESS OF LEFT LEG: ICD-10-CM

## 2023-01-25 DIAGNOSIS — Z96.652 S/P TOTAL KNEE REPLACEMENT, LEFT: ICD-10-CM

## 2023-01-25 DIAGNOSIS — M25.562 ACUTE PAIN OF LEFT KNEE: ICD-10-CM

## 2023-01-25 DIAGNOSIS — M25.662 DECREASED RANGE OF MOTION (ROM) OF LEFT KNEE: Primary | ICD-10-CM

## 2023-01-25 PROCEDURE — 97110 THERAPEUTIC EXERCISES: CPT | Mod: CQ

## 2023-01-25 PROCEDURE — 97112 NEUROMUSCULAR REEDUCATION: CPT | Mod: CQ

## 2023-01-25 PROCEDURE — 97140 MANUAL THERAPY 1/> REGIONS: CPT | Mod: CQ

## 2023-01-25 NOTE — PROGRESS NOTES
[facility]   Physical Therapy Treatment Note     Name: Mukesh Loera  Clinic Number: [id number]    Visit Date: 1/25/2023    Therapy Diagnosis: Left TKA  Physician: Damir Blakely III, MD     Physician Orders: PT Eval and Treat   Medical Diagnosis from Referral: Left TKA  Evaluation Date: 1/4/2023  DOS : 11/30/2022  Authorization Period Expiration: 12/19/2023  Plan of Care Expiration: 3/3/2023    Visit # / Visits authorized: 6 / 18  PTA Visit #: 1/5     Time In: 11:09 am  Time Out: 12:09 pm  Total Billable Time: 60 minutes    SUBJECTIVE     Pt reports: his knee is sore because of the arthritis in his L knee  He was compliant with home exercise program.  Response to previous treatment: Soreness   Functional change: He is on crutches now rather than a walker    Pain: 1/10 at rest; 4-5/10 with Stretches   Location: right knee      Prior Level of Function: Independent with his job  Current Level of Function: Requires use of walker for ambulation.     OBJECTIVE     Objective Measures updated at progress report unless specified.   1/25/2023   Active Flexion: NTV  Passive Flexion: 105  Active Extension: -10  Passive Extension: -5    Treatment     RAY received the treatments listed below:      therapeutic exercises to develop strength, endurance, and ROM for 25 minutes including:  Bike x 5 minutes (rocking F/R)  SB 5x20 seconds  Hamstring Stretch on Stairs 5x20 seconds  Knee Flexion Stretch on Stairs 5x20 seconds  Prone quad with belt - 10x10 seconds  Standing Rev Lunge Stretch 20x    Exercises Added on 1/18/2023 :   Cybex Leg Press Bilateral 3 x 10 with 10 Plates   Cybex Leg Press Left Only 3 x 10 with 5 Plates   Cybex Knee Extension 3 x 10 with 3 Plates   Cybex Hamstring Curls 3 x 10 with 5 Plates      Supine :   Quad Sets 40x  Heel Slides 20x  Straight Leg Raises 30x  Hip Abduction   Bridging      Sitting :  Marching   TKEs 4 plates 30x  Hamstring Curls   Hip Abd/Add  Ankle Pumps       Standing :  Standing Marches  Squat to chair/Shallow standing knee bends  Hip Abduction   Heel Raises 30x     Single Leg Stance - Firm   Single Leg Stance - Foam     manual therapy techniques: Joint mobilizations, Manual traction, and Myofacial release were applied to the: knee for 15 minutes, including:  Manual Flexion Progression  Manual Extension Mobs  Patellar Mobs  Prone Quad MET    neuromuscular re-education activities to improve: Balance, Coordination, Kinesthetic, and Proprioception for 10 minutes. The following activities were included:  Quad sets with heel prop 50x  Heel strike when ambulating  Short Arc Quads with Belt - 20x5  sec hold    therapeutic activities to improve functional performance for 0  minutes, including:      gait training to improve functional mobility and safety for 0  minutes, including:    Patient received the following supervised modalities after being cleared for contradictions: Pre-Mod Electrical Stimulation:  Patient received Pre-Mod Electrical Stimulation for pain control applied to the Left Knee. Pt received stimulation at a frequency of  Hz for 15 minutes. Patient tolerated treatment well without any adverse effects.       Patient received Vasopneumatic pump via Game Ready to Left Knee x 15 Min       Patient Education and Home Exercises     Home Exercises Provided and Patient Education Provided     Education provided:   - None    Written Home Exercises Provided: Patient instructed to cont prior HEP. Exercises were reviewed and JONATHAN was able to demonstrate them prior to the end of the session.  RAY demonstrated good  understanding of the education provided. See EMR under Patient Instructions for exercises provided during therapy sessions    ASSESSMENT     Pt tolerated all therapeutic exercises, neuromuscular re-education, and manual RANGE OF MOTION today with mild c/o pain noted. Pt's flexion is very tight but with moderate stretching and relaxation was able to  progress some as noted above. No change in active knee extension but pt has more quad function today and was able to perform quad sets without hamstring takeover. Educated pt on importance of being diligent with his HEP every day. Will continue to progress as able.           PMH:  Jonathan is a 57 y.o. male referred to outpatient Physical Therapy with a medical diagnosis of Left TKA. JOANTHAN reports: He had severe OA in the Left Knee for years and subsequently underwent a Left TKA on 11/30/2022. He has been receiving Home Health Physical Therapy but Dr Blakely is worried about his Range of Motion and has referred patient to begin Outpatient Physical Therapy.   Patient presents with decreased Left knee Range of Motion with Flexion limited to 80 degrees and Extension at -10 degrees from zero  at time of Evaluation. Patient keeps knee flexed at approx 60 degrees most of the time as this appears to be his position of comfort. Patient is unable to flex the knee to 90 degrees and therefore sit to stand is difficult. He requires use of arms and additional time to perform sit to stand. Patient requires use of a rolling walker for ambulation. Patient is noted to have decreased stance time and antalgic gait on the Left. Patient has decreased heel strike and hits foot flat due to lack of Full Knee Extension with initial contact on the Left. Patient has decreased step/stride length resulting in slow eliana. A Home Exercise Program was established today. All exercises were reviewed with the patient but this will need to be reinforced due to decreased health literacy. Patient will benefit from skilled Physical Therapy intervention to address all deficits and to help him return to his prior level of function.       JONATHAN Is progressing towards his goals.   Pt prognosis is Good.     Pt will continue to benefit from skilled outpatient physical therapy to address the deficits listed in the problem list box on initial evaluation, provide  pt/family education and to maximize pt's level of independence in the home and community environment.     Pt's spiritual, cultural and educational needs considered and pt agreeable to plan of care and goals.     Anticipated barriers to physical therapy: None    Goals: Short Term Goals: 4 weeks   1. Independent with Home Exercise Program   2. Increase Left Knee Range of Motion to 0 Degrees to 100 Degrees  3. Increase Left Knee Strength to grossly 4/5  4. Patient will ambulate 500 feet without an assistive device with complaints of pain Less than or Equal to 4/10.       Long Term Goals: 8 weeks   1. Patient will increase Left Knee Strength to grossly 4+/5  2. Patient will increase Left Knee Range of Motion to 0 Degrees to 120 Degrees   3. Patient will ambulate 1000+ feet with step length more equal from Left to Right and with complaints of pain Less than or Equal to 2/10.     PLAN     Plan of care Certification: 1/4/2023 to 3/3/2023.     Outpatient Physical Therapy 2 times weekly for 8 weeks to include the following interventions: Electrical Stimulation to increase strength and decrease pain and inflammation as needed, Gait Training, Manual Therapy, Moist Heat/ Ice, Neuromuscular Re-ed, Patient Education, Therapeutic Activities, and Therapeutic Exercise.        Meliton Damian, PTA,   01/25/2023

## 2023-01-30 ENCOUNTER — CLINICAL SUPPORT (OUTPATIENT)
Dept: REHABILITATION | Facility: HOSPITAL | Age: 58
End: 2023-01-30
Payer: COMMERCIAL

## 2023-01-30 DIAGNOSIS — Z96.652 S/P TOTAL KNEE REPLACEMENT, LEFT: ICD-10-CM

## 2023-01-30 DIAGNOSIS — M25.662 DECREASED RANGE OF MOTION (ROM) OF LEFT KNEE: Primary | ICD-10-CM

## 2023-01-30 DIAGNOSIS — M25.562 ACUTE PAIN OF LEFT KNEE: ICD-10-CM

## 2023-01-30 DIAGNOSIS — R29.898 WEAKNESS OF LEFT LEG: ICD-10-CM

## 2023-01-30 PROCEDURE — 97112 NEUROMUSCULAR REEDUCATION: CPT | Mod: CQ

## 2023-01-30 PROCEDURE — 97140 MANUAL THERAPY 1/> REGIONS: CPT | Mod: CQ

## 2023-01-30 PROCEDURE — 97110 THERAPEUTIC EXERCISES: CPT | Mod: CQ

## 2023-01-30 NOTE — PROGRESS NOTES
[facility]   Physical Therapy Treatment Note     Name: Mukesh Loera  Clinic Number: [id number]    Visit Date: 1/30/2023    Therapy Diagnosis: Left TKA  Physician: Damir Blakely III, MD     Physician Orders: PT Eval and Treat   Medical Diagnosis from Referral: Left TKA  Evaluation Date: 1/4/2023  DOS : 11/30/2022  Authorization Period Expiration: 12/19/2023  Plan of Care Expiration: 3/3/2023    Visit # / Visits authorized: 7 / 18  PTA Visit #: 2/5     Time In: 11:15 am  Time Out: 12:10 pm  Total Billable Time: 55 minutes    SUBJECTIVE     Pt reports: sore due to weather  He was compliant with home exercise program.  Response to previous treatment: Soreness   Functional change: He is on crutches now rather than a walker    Pain: 1/10 at rest; 4-5/10 with Stretches   Location: right knee      Prior Level of Function: Independent with his job  Current Level of Function: Requires use of walker for ambulation.     OBJECTIVE     Objective Measures updated at progress report unless specified.   1/30/2023   Active Flexion: 93  Passive Flexion: 107  Active Extension: -10  Passive Extension: -5    Treatment     RAY received the treatments listed below:      therapeutic exercises to develop strength, endurance, and ROM for 30 minutes including:  Bike x 5 minutes (rocking F/R)  SB 5x20 seconds  Hamstring Stretch on Stairs 5x20 seconds  Knee Flexion Stretch on Stairs 10x10  seconds  Prone quad with belt - 10x10 seconds  Standing Rev Lunge Stretch 20x    Exercises Added on 1/18/2023 :   Cybex Leg Press Bilateral 2 x 10 with 10 Plates with rock onto heels  Cybex Leg Press Left Only 2 x 10 with 5 Plates with rock onto heels  Cybex Knee Extension 3 x 10 with 3 Plates   Cybex Hamstring Curls 3 x 10 with 5 Plates      Supine :   Quad Sets 40x  Heel Slides 20x  Straight Leg Raises 30x  Hip Abduction   Bridging      Standing :  Standing Marches  Squat to chair/Shallow standing knee bends  Hip  Abduction   Heel Raises 30x off step  Reverse TERMINAL KNEE EXTENSION 4 plates 30x     Single Leg Stance - Firm   Single Leg Stance - Foam     manual therapy techniques: Joint mobilizations, Manual traction, and Myofacial release were applied to the: knee for 15 minutes, including:  Manual Flexion Progression  Manual Extension Mobs  Patellar Mobs  Prone Quad MET    neuromuscular re-education activities to improve: Balance, Coordination, Kinesthetic, and Proprioception for 8 minutes. The following activities were included:  Quad sets with heel prop 50x  Short Arc Quads with Belt - 20x5  sec hold    therapeutic activities to improve functional performance for 0  minutes, including:      gait training to improve functional mobility and safety for 0  minutes, including:    Patient received the following supervised modalities after being cleared for contradictions: Pre-Mod Electrical Stimulation:  Patient received Pre-Mod Electrical Stimulation for pain control applied to the Left Knee. Pt received stimulation at a frequency of  Hz for 0 minutes. Patient tolerated treatment well without any adverse effects.       Patient received Vasopneumatic pump via Game Ready to Left Knee x 0 Min       Patient Education and Home Exercises     Home Exercises Provided and Patient Education Provided     Education provided:   - None    Written Home Exercises Provided: Patient instructed to cont prior HEP. Exercises were reviewed and JONATHAN was able to demonstrate them prior to the end of the session.  JONATHAN demonstrated good  understanding of the education provided. See EMR under Patient Instructions for exercises provided during therapy sessions    ASSESSMENT     Pt tolerated all therapeutic exercises, activities, and manual RANGE OF MOTION today with mild pain noted. Pt's flexion RANGE OF MOTION is improving with moderate guarding present. No change in extension RANGE OF MOTION. Pt has hard time isolating quad and so e-stim may be  required to facilitate this. Will progress as able.          PMH:  Jonathan is a 57 y.o. male referred to outpatient Physical Therapy with a medical diagnosis of Left TKA. JONATHAN reports: He had severe OA in the Left Knee for years and subsequently underwent a Left TKA on 11/30/2022. He has been receiving Home Health Physical Therapy but Dr Blakely is worried about his Range of Motion and has referred patient to begin Outpatient Physical Therapy.   Patient presents with decreased Left knee Range of Motion with Flexion limited to 80 degrees and Extension at -10 degrees from zero  at time of Evaluation. Patient keeps knee flexed at approx 60 degrees most of the time as this appears to be his position of comfort. Patient is unable to flex the knee to 90 degrees and therefore sit to stand is difficult. He requires use of arms and additional time to perform sit to stand. Patient requires use of a rolling walker for ambulation. Patient is noted to have decreased stance time and antalgic gait on the Left. Patient has decreased heel strike and hits foot flat due to lack of Full Knee Extension with initial contact on the Left. Patient has decreased step/stride length resulting in slow eliana. A Home Exercise Program was established today. All exercises were reviewed with the patient but this will need to be reinforced due to decreased health literacy. Patient will benefit from skilled Physical Therapy intervention to address all deficits and to help him return to his prior level of function.       JONATHAN Is progressing towards his goals.   Pt prognosis is Good.     Pt will continue to benefit from skilled outpatient physical therapy to address the deficits listed in the problem list box on initial evaluation, provide pt/family education and to maximize pt's level of independence in the home and community environment.     Pt's spiritual, cultural and educational needs considered and pt agreeable to plan of care and goals.      Anticipated barriers to physical therapy: None    Goals: Short Term Goals: 4 weeks   1. Independent with Home Exercise Program   2. Increase Left Knee Range of Motion to 0 Degrees to 100 Degrees  3. Increase Left Knee Strength to grossly 4/5  4. Patient will ambulate 500 feet without an assistive device with complaints of pain Less than or Equal to 4/10.       Long Term Goals: 8 weeks   1. Patient will increase Left Knee Strength to grossly 4+/5  2. Patient will increase Left Knee Range of Motion to 0 Degrees to 120 Degrees   3. Patient will ambulate 1000+ feet with step length more equal from Left to Right and with complaints of pain Less than or Equal to 2/10.     PLAN     Plan of care Certification: 1/4/2023 to 3/3/2023.     Outpatient Physical Therapy 2 times weekly for 8 weeks to include the following interventions: Electrical Stimulation to increase strength and decrease pain and inflammation as needed, Gait Training, Manual Therapy, Moist Heat/ Ice, Neuromuscular Re-ed, Patient Education, Therapeutic Activities, and Therapeutic Exercise.        Meliton Damian PTA,   01/30/2023

## 2023-02-01 ENCOUNTER — CLINICAL SUPPORT (OUTPATIENT)
Dept: REHABILITATION | Facility: HOSPITAL | Age: 58
End: 2023-02-01
Payer: COMMERCIAL

## 2023-02-01 DIAGNOSIS — M25.662 DECREASED RANGE OF MOTION (ROM) OF LEFT KNEE: Primary | ICD-10-CM

## 2023-02-01 DIAGNOSIS — Z96.652 S/P TOTAL KNEE REPLACEMENT, LEFT: ICD-10-CM

## 2023-02-01 DIAGNOSIS — R29.898 WEAKNESS OF LEFT LEG: ICD-10-CM

## 2023-02-01 DIAGNOSIS — M25.562 ACUTE PAIN OF LEFT KNEE: ICD-10-CM

## 2023-02-01 PROCEDURE — 97140 MANUAL THERAPY 1/> REGIONS: CPT | Mod: CQ

## 2023-02-01 PROCEDURE — 97112 NEUROMUSCULAR REEDUCATION: CPT | Mod: CQ

## 2023-02-01 PROCEDURE — 97110 THERAPEUTIC EXERCISES: CPT | Mod: CQ

## 2023-02-01 NOTE — PROGRESS NOTES
[facility]   Physical Therapy Treatment Note     Name: Jonathan Loera  Clinic Number: [id number]    Visit Date: 2/1/2023    Therapy Diagnosis: Left TKA  Physician: Damir Blakely III, MD     Physician Orders: PT Eval and Treat   Medical Diagnosis from Referral: Left TKA  Evaluation Date: 1/4/2023  DOS : 11/30/2022  Authorization Period Expiration: 12/19/2023  Plan of Care Expiration: 3/3/2023    Visit # / Visits authorized: 8 / 18  PTA Visit #: 3/5     Time In: 11:15 am  Time Out: 12:03 pm  Total Billable Time: 48 minutes    SUBJECTIVE     Pt reports: sore today  He was compliant with home exercise program.  Response to previous treatment: Soreness   Functional change: He is on crutches now rather than a walker    Pain: 1/10 at rest; 4-5/10 with Stretches   Location: right knee      Prior Level of Function: Independent with his job  Current Level of Function: Requires use of walker for ambulation.     OBJECTIVE     Objective Measures updated at progress report unless specified.   2/1/2023   Active Flexion: 93  Passive Flexion: 107  Active Extension: -10  Passive Extension: -5    Treatment     JONATHAN received the treatments listed below:      therapeutic exercises to develop strength, endurance, and ROM for 15 minutes including:  Bike x 5 minutes (rocking F/R)  SB 5x20 seconds  Hamstring Stretch on Stairs 5x20 seconds  Knee Flexion Stretch on Stairs 10x10  seconds  Prone quad with belt - 10x10 seconds  Standing Rev Lunge Stretch 20x    Exercises Added on 1/18/2023 :   Cybex Leg Press Bilateral 2 x 10 with 10 Plates with rock onto heels  Cybex Leg Press Left Only 3 x 10 with 5 Plates with rock onto heels  Cybex Knee Extension 3 x 10 with 3 Plates   Cybex Hamstring Curls 3 x 10 with 5 Plates      Supine :   Quad Sets 40x  Heel Slides 20x  Straight Leg Raises 30x  Hip Abduction   Bridging      Standing :  Standing Marches  Squat to chair/Shallow standing knee bends  Hip Abduction    Heel Raises 30x off step  Reverse TERMINAL KNEE EXTENSION 4 plates 30x     Single Leg Stance - Firm   Single Leg Stance - Foam     manual therapy techniques: Joint mobilizations, Manual traction, and Myofacial release were applied to the: knee for 10 minutes, including:  Manual Flexion Progression  Manual Extension Mobs  Patellar Mobs  Prone Quad MET    neuromuscular re-education activities to improve: Balance, Coordination, Kinesthetic, and Proprioception for 15 minutes. The following activities were included:  Quad sets with Russian E-stim x 7.5 minutes   Short Arc Quads with Belt with Russian E-stim x 7.5 minutes    therapeutic activities to improve functional performance for 0  minutes, including:      gait training to improve functional mobility and safety for 0  minutes, including:    Patient received the following supervised modalities after being cleared for contradictions: Pre-Mod Electrical Stimulation:  Patient received Pre-Mod Electrical Stimulation for pain control applied to the Left Knee. Pt received stimulation at a frequency of  Hz for 0 minutes. Patient tolerated treatment well without any adverse effects.       Patient received Vasopneumatic pump via Game Ready to Left Knee x 0 Min       Patient Education and Home Exercises     Home Exercises Provided and Patient Education Provided     Education provided:   - None    Written Home Exercises Provided: Patient instructed to cont prior HEP. Exercises were reviewed and JONATHAN was able to demonstrate them prior to the end of the session.  JONATHAN demonstrated good  understanding of the education provided. See EMR under Patient Instructions for exercises provided during therapy sessions    ASSESSMENT     Started session with Russian E-stim today using 20 sec on/10 sec off with 2 sec ramp parameter. Half of the time was used for quad sets and other half for SAQ with belt pull to end range. Progressed closed chain quad exercises after this. Pt has notable  increase in quad activation and was able to get to -8 today actively which is a big improvement as pt has been stuck at -10. Will continue to progress as able. E-stim seems to really help quad activation so will continue with this as well.           PMH:  Jonathan is a 57 y.o. male referred to outpatient Physical Therapy with a medical diagnosis of Left TKA. JONATHAN reports: He had severe OA in the Left Knee for years and subsequently underwent a Left TKA on 11/30/2022. He has been receiving Home Health Physical Therapy but Dr Blakely is worried about his Range of Motion and has referred patient to begin Outpatient Physical Therapy.   Patient presents with decreased Left knee Range of Motion with Flexion limited to 80 degrees and Extension at -10 degrees from zero  at time of Evaluation. Patient keeps knee flexed at approx 60 degrees most of the time as this appears to be his position of comfort. Patient is unable to flex the knee to 90 degrees and therefore sit to stand is difficult. He requires use of arms and additional time to perform sit to stand. Patient requires use of a rolling walker for ambulation. Patient is noted to have decreased stance time and antalgic gait on the Left. Patient has decreased heel strike and hits foot flat due to lack of Full Knee Extension with initial contact on the Left. Patient has decreased step/stride length resulting in slow eliana. A Home Exercise Program was established today. All exercises were reviewed with the patient but this will need to be reinforced due to decreased health literacy. Patient will benefit from skilled Physical Therapy intervention to address all deficits and to help him return to his prior level of function.       JONATHAN Is progressing towards his goals.   Pt prognosis is Good.     Pt will continue to benefit from skilled outpatient physical therapy to address the deficits listed in the problem list box on initial evaluation, provide pt/family education and to  maximize pt's level of independence in the home and community environment.     Pt's spiritual, cultural and educational needs considered and pt agreeable to plan of care and goals.     Anticipated barriers to physical therapy: None    Goals: Short Term Goals: 4 weeks   1. Independent with Home Exercise Program   2. Increase Left Knee Range of Motion to 0 Degrees to 100 Degrees  3. Increase Left Knee Strength to grossly 4/5  4. Patient will ambulate 500 feet without an assistive device with complaints of pain Less than or Equal to 4/10.       Long Term Goals: 8 weeks   1. Patient will increase Left Knee Strength to grossly 4+/5  2. Patient will increase Left Knee Range of Motion to 0 Degrees to 120 Degrees   3. Patient will ambulate 1000+ feet with step length more equal from Left to Right and with complaints of pain Less than or Equal to 2/10.     PLAN     Plan of care Certification: 1/4/2023 to 3/3/2023.     Outpatient Physical Therapy 2 times weekly for 8 weeks to include the following interventions: Electrical Stimulation to increase strength and decrease pain and inflammation as needed, Gait Training, Manual Therapy, Moist Heat/ Ice, Neuromuscular Re-ed, Patient Education, Therapeutic Activities, and Therapeutic Exercise.        Meliton Damian, PTA,   02/01/2023

## 2023-02-06 ENCOUNTER — CLINICAL SUPPORT (OUTPATIENT)
Dept: REHABILITATION | Facility: HOSPITAL | Age: 58
End: 2023-02-06
Payer: COMMERCIAL

## 2023-02-06 DIAGNOSIS — Z96.652 S/P TOTAL KNEE REPLACEMENT, LEFT: ICD-10-CM

## 2023-02-06 DIAGNOSIS — M25.562 ACUTE PAIN OF LEFT KNEE: ICD-10-CM

## 2023-02-06 DIAGNOSIS — M25.662 DECREASED RANGE OF MOTION (ROM) OF LEFT KNEE: Primary | ICD-10-CM

## 2023-02-06 DIAGNOSIS — R29.898 WEAKNESS OF LEFT LEG: ICD-10-CM

## 2023-02-06 PROCEDURE — 97140 MANUAL THERAPY 1/> REGIONS: CPT

## 2023-02-06 PROCEDURE — 97110 THERAPEUTIC EXERCISES: CPT

## 2023-02-06 PROCEDURE — 97112 NEUROMUSCULAR REEDUCATION: CPT

## 2023-02-06 NOTE — PROGRESS NOTES
"[facility]   Physical Therapy Treatment Note     Name: Jonathan Loera  Clinic Number: [id number]    Visit Date: 2/6/2023    Therapy Diagnosis: Left TKA  Physician: Damir Blakely III, MD     Physician Orders: PT Eval and Treat   Medical Diagnosis from Referral: Left TKA  Evaluation Date: 1/4/2023  DOS : 11/30/2022  Authorization Period Expiration: 12/19/2023  Plan of Care Expiration: 3/3/2023    Visit # / Visits authorized: 9 / 18  PTA Visit #: 3/5     Time In: 11:00 am  Time Out: 12:00 pm  Total Billable Time: 55 minutes    SUBJECTIVE     Pt reports: he is stretching a "little bit" at home  He was compliant with home exercise program.  Response to previous treatment: Soreness   Functional change: He is on crutches now rather than a walker    Pain: 1/10 at rest; 4-5/10 with Stretches   Location: Left knee      Prior Level of Function: Independent with his job  Current Level of Function: Requires use of walker for ambulation.     OBJECTIVE     Objective Measures updated at progress report unless specified.   2/6/2023   Active Flexion: 93  Passive Flexion: 107  Active Extension: -8 following E Stim   Passive Extension: -3 with Max Over pressure     Treatment     JONATHAN received the treatments listed below:      therapeutic exercises to develop strength, endurance, and ROM for 30 minutes including:  Bike x 5 minutes (rocking F/R)  SB 5x20 seconds  Hamstring Stretch on Stairs 5x20 seconds  Knee Flexion Stretch on Stairs 10x10  seconds  Prone quad with belt - 10x10 seconds  Standing Rev Lunge Stretch 20x    Exercises Added on 1/18/2023 :   Cybex Leg Press Bilateral 2 x 10 with 10 Plates with rock onto heels  Cybex Leg Press Left Only 3 x 10 with 5 Plates with rock onto heels  Cybex Knee Extension 3 x 10 with 3 Plates   Cybex Hamstring Curls 3 x 10 with 5 Plates      Supine :   Quad Sets 40x  Heel Slides 20x  Straight Leg Raises 30x  Hip Abduction   Bridging      Standing :  Standing " Marches  Squat to chair/Shallow standing knee bends  Hip Abduction   Heel Raises 30x off step  Reverse TERMINAL KNEE EXTENSION 4 plates 30x     Single Leg Stance - Firm   Single Leg Stance - Foam     manual therapy techniques: Joint mobilizations, Manual traction, and Myofacial release were applied to the: knee for 10 minutes, including:  Manual Flexion Progression  Manual Extension Mobs  Patellar Mobs  Prone Quad MET    neuromuscular re-education activities to improve: Balance, Coordination, Kinesthetic, and Proprioception for 15 minutes. The following activities were included:  Quad sets with Russian E-stim x 7.5 minutes   Short Arc Quads with Belt with Russian E-stim x 7.5 minutes    therapeutic activities to improve functional performance for 0  minutes, including:      gait training to improve functional mobility and safety for 0  minutes, including:    Patient received the following supervised modalities after being cleared for contradictions: Pre-Mod Electrical Stimulation:  Patient received Pre-Mod Electrical Stimulation for pain control applied to the Left Knee. Pt received stimulation at a frequency of  Hz for 0 minutes. Patient tolerated treatment well without any adverse effects.       Patient received Vasopneumatic pump via Game Ready to Left Knee x 0 Min       Patient Education and Home Exercises     Home Exercises Provided and Patient Education Provided     Education provided:   - None    Written Home Exercises Provided: Patient instructed to cont prior HEP. Exercises were reviewed and JONATHAN was able to demonstrate them prior to the end of the session.  RAY demonstrated good  understanding of the education provided. See EMR under Patient Instructions for exercises provided during therapy sessions    ASSESSMENT     Patient continues to have limited Range of Motion for Left Knee Flexion and Extension. Therapist is working to aggressively increase his Range of Motion. We have discussed the importance  of stretching multiple times a day at home. Max education has been performed on this. Therapist performed Russian E-Stim to assist with Quad sets and SAQs to encourage full knee extension. Therapist provided manual stretches for both Extension and Flexion. Had patient perform prone knee flexion stretches with a strap for over-pressure. Slight improvement noted with both Flexion and Extension Range of Motion by end of session.               PMH:  Jonathan is a 57 y.o. male referred to outpatient Physical Therapy with a medical diagnosis of Left TKA. JONATHAN reports: He had severe OA in the Left Knee for years and subsequently underwent a Left TKA on 11/30/2022. He has been receiving Home Health Physical Therapy but Dr Blakely is worried about his Range of Motion and has referred patient to begin Outpatient Physical Therapy.   Patient presents with decreased Left knee Range of Motion with Flexion limited to 80 degrees and Extension at -10 degrees from zero  at time of Evaluation. Patient keeps knee flexed at approx 60 degrees most of the time as this appears to be his position of comfort. Patient is unable to flex the knee to 90 degrees and therefore sit to stand is difficult. He requires use of arms and additional time to perform sit to stand. Patient requires use of a rolling walker for ambulation. Patient is noted to have decreased stance time and antalgic gait on the Left. Patient has decreased heel strike and hits foot flat due to lack of Full Knee Extension with initial contact on the Left. Patient has decreased step/stride length resulting in slow eliana. A Home Exercise Program was established today. All exercises were reviewed with the patient but this will need to be reinforced due to decreased health literacy. Patient will benefit from skilled Physical Therapy intervention to address all deficits and to help him return to his prior level of function.       JONATHAN Is progressing towards his goals.   Pt prognosis is  Good.     Pt will continue to benefit from skilled outpatient physical therapy to address the deficits listed in the problem list box on initial evaluation, provide pt/family education and to maximize pt's level of independence in the home and community environment.     Pt's spiritual, cultural and educational needs considered and pt agreeable to plan of care and goals.     Anticipated barriers to physical therapy: None    Goals: Short Term Goals: 4 weeks   1. Independent with Home Exercise Program   2. Increase Left Knee Range of Motion to 0 Degrees to 100 Degrees  3. Increase Left Knee Strength to grossly 4/5  4. Patient will ambulate 500 feet without an assistive device with complaints of pain Less than or Equal to 4/10.       Long Term Goals: 8 weeks   1. Patient will increase Left Knee Strength to grossly 4+/5  2. Patient will increase Left Knee Range of Motion to 0 Degrees to 120 Degrees   3. Patient will ambulate 1000+ feet with step length more equal from Left to Right and with complaints of pain Less than or Equal to 2/10.     PLAN     Plan of care Certification: 1/4/2023 to 3/3/2023.     Outpatient Physical Therapy 2 times weekly for 8 weeks to include the following interventions: Electrical Stimulation to increase strength and decrease pain and inflammation as needed, Gait Training, Manual Therapy, Moist Heat/ Ice, Neuromuscular Re-ed, Patient Education, Therapeutic Activities, and Therapeutic Exercise.        MARLENE RICHEY, PT, DPT   02/06/2023

## 2023-02-08 ENCOUNTER — CLINICAL SUPPORT (OUTPATIENT)
Dept: REHABILITATION | Facility: HOSPITAL | Age: 58
End: 2023-02-08
Payer: COMMERCIAL

## 2023-02-08 DIAGNOSIS — Z96.652 S/P TOTAL KNEE REPLACEMENT, LEFT: ICD-10-CM

## 2023-02-08 DIAGNOSIS — M25.662 DECREASED RANGE OF MOTION (ROM) OF LEFT KNEE: Primary | ICD-10-CM

## 2023-02-08 DIAGNOSIS — M25.562 ACUTE PAIN OF LEFT KNEE: ICD-10-CM

## 2023-02-08 DIAGNOSIS — R29.898 WEAKNESS OF LEFT LEG: ICD-10-CM

## 2023-02-08 PROCEDURE — 97112 NEUROMUSCULAR REEDUCATION: CPT | Mod: CQ

## 2023-02-08 PROCEDURE — 97140 MANUAL THERAPY 1/> REGIONS: CPT | Mod: CQ

## 2023-02-08 PROCEDURE — 97110 THERAPEUTIC EXERCISES: CPT | Mod: CQ

## 2023-02-08 NOTE — PROGRESS NOTES
"[facility]   Physical Therapy Treatment Note     Name: Jonathan Loera  Clinic Number: [id number]    Visit Date: 2/8/2023    Therapy Diagnosis: Left TKA  Physician: Damir Blakely III, MD     Physician Orders: PT Eval and Treat   Medical Diagnosis from Referral: Left TKA  Evaluation Date: 1/4/2023  DOS : 11/30/2022  Authorization Period Expiration: 12/19/2023  Plan of Care Expiration: 3/3/2023    Visit # / Visits authorized: 10 / 18  PTA Visit #: 1/5     Time In: 10:44 am  Time Out: 11:37 am  Total Billable Time: 53 minutes    SUBJECTIVE     Pt reports: he is stretching a "little bit" at home. Reports he felt a weird feeling when doing his HEP so he stopped doing them  He was compliant with home exercise program.  Response to previous treatment: Soreness   Functional change: He is on crutches now rather than a walker    Pain: 1/10 at rest; 4-5/10 with Stretches   Location: Left knee      Prior Level of Function: Independent with his job  Current Level of Function: Requires use of walker for ambulation.     OBJECTIVE     Objective Measures updated at progress report unless specified.   2/8/2023   Active Flexion: 93  Passive Flexion: 107  Active Extension: -8 following E Stim   Passive Extension: -3 with Max Over pressure     Treatment     JONATHAN received the treatments listed below:      therapeutic exercises to develop strength, endurance, and ROM for 18 minutes including:  Bike x 5 minutes (rocking F/R)  SB 5x20 seconds  Hamstring Stretch on Stairs 5x20 seconds  Knee Flexion Stretch on Stairs 10x10  seconds  Prone quad with belt - 10x10 seconds  Standing Rev Lunge Stretch 20x    Exercises Added on 1/18/2023 :     Cybex Knee Extension 3 x 10 with 3 Plates   Cybex Hamstring Curls 3 x 10 with 5 Plates      Supine :   Quad Sets 40x  Heel Slides 20x  Straight Leg Raises 30x  Hip Abduction   Bridging      Standing :  Standing Marches  Squat to chair/Shallow standing knee bends  Hip " Abduction   Heel Raises 30x off step  Reverse TERMINAL KNEE EXTENSION 4 plates 30x     Single Leg Stance - Firm   Single Leg Stance - Foam     manual therapy techniques: Joint mobilizations, Manual traction, and Myofacial release were applied to the: knee for 12 minutes, including:  Manual Flexion Progression  Manual Extension Mobs  Patellar Mobs  Prone Quad MET    neuromuscular re-education activities to improve: Balance, Coordination, Kinesthetic, and Proprioception for 23 minutes. The following activities were included:  (10 on/20 off, 2 second ramp)  Quad sets with Russian E-stim x 5 minutes   Short Arc Quads with Belt with Russian E-stim x 5 minutes  Prone Quad Sets x 5 minutes with Russian E-stim  Cybex Leg Press Bilateral 2 x 10 with 10 Plates with rock onto heels  Cybex Leg Press Left Only 3 x 10 with 5 Plates with rock onto heels      therapeutic activities to improve functional performance for 0  minutes, including:      gait training to improve functional mobility and safety for 0  minutes, including:    Patient received the following supervised modalities after being cleared for contradictions: Pre-Mod Electrical Stimulation:  Patient received Pre-Mod Electrical Stimulation for pain control applied to the Left Knee. Pt received stimulation at a frequency of  Hz for 0 minutes. Patient tolerated treatment well without any adverse effects.       Patient received Vasopneumatic pump via Game Ready to Left Knee x 0 Min       Patient Education and Home Exercises     Home Exercises Provided and Patient Education Provided     Education provided:   - None    Written Home Exercises Provided: Patient instructed to cont prior HEP. Exercises were reviewed and JONATHAN was able to demonstrate them prior to the end of the session.  JONATHAN demonstrated good  understanding of the education provided. See EMR under Patient Instructions for exercises provided during therapy sessions    ASSESSMENT     Pt's quad function is  slowly improving and flexion is noticeably improving. Pt still lacks about -8 active knee extension. Kenyan E-stim performed with parameters listed above. Will continue to progress closed chain quad exercises. Educated pt to keep up with his HEP diligently.          PMH:  Jonathan is a 57 y.o. male referred to outpatient Physical Therapy with a medical diagnosis of Left TKA. JONATHAN reports: He had severe OA in the Left Knee for years and subsequently underwent a Left TKA on 11/30/2022. He has been receiving Home Health Physical Therapy but Dr Blakely is worried about his Range of Motion and has referred patient to begin Outpatient Physical Therapy.   Patient presents with decreased Left knee Range of Motion with Flexion limited to 80 degrees and Extension at -10 degrees from zero  at time of Evaluation. Patient keeps knee flexed at approx 60 degrees most of the time as this appears to be his position of comfort. Patient is unable to flex the knee to 90 degrees and therefore sit to stand is difficult. He requires use of arms and additional time to perform sit to stand. Patient requires use of a rolling walker for ambulation. Patient is noted to have decreased stance time and antalgic gait on the Left. Patient has decreased heel strike and hits foot flat due to lack of Full Knee Extension with initial contact on the Left. Patient has decreased step/stride length resulting in slow eliana. A Home Exercise Program was established today. All exercises were reviewed with the patient but this will need to be reinforced due to decreased health literacy. Patient will benefit from skilled Physical Therapy intervention to address all deficits and to help him return to his prior level of function.       JONATHAN Is progressing towards his goals.   Pt prognosis is Good.     Pt will continue to benefit from skilled outpatient physical therapy to address the deficits listed in the problem list box on initial evaluation, provide pt/family  education and to maximize pt's level of independence in the home and community environment.     Pt's spiritual, cultural and educational needs considered and pt agreeable to plan of care and goals.     Anticipated barriers to physical therapy: None    Goals: Short Term Goals: 4 weeks   1. Independent with Home Exercise Program   2. Increase Left Knee Range of Motion to 0 Degrees to 100 Degrees  3. Increase Left Knee Strength to grossly 4/5  4. Patient will ambulate 500 feet without an assistive device with complaints of pain Less than or Equal to 4/10.       Long Term Goals: 8 weeks   1. Patient will increase Left Knee Strength to grossly 4+/5  2. Patient will increase Left Knee Range of Motion to 0 Degrees to 120 Degrees   3. Patient will ambulate 1000+ feet with step length more equal from Left to Right and with complaints of pain Less than or Equal to 2/10.     PLAN     Plan of care Certification: 1/4/2023 to 3/3/2023.     Outpatient Physical Therapy 2 times weekly for 8 weeks to include the following interventions: Electrical Stimulation to increase strength and decrease pain and inflammation as needed, Gait Training, Manual Therapy, Moist Heat/ Ice, Neuromuscular Re-ed, Patient Education, Therapeutic Activities, and Therapeutic Exercise.        Meliton Damian, PTA  02/08/2023

## 2023-02-13 ENCOUNTER — EXTERNAL HOME HEALTH (OUTPATIENT)
Dept: HOME HEALTH SERVICES | Facility: HOSPITAL | Age: 58
End: 2023-02-13

## 2023-02-13 ENCOUNTER — CLINICAL SUPPORT (OUTPATIENT)
Dept: REHABILITATION | Facility: HOSPITAL | Age: 58
End: 2023-02-13
Payer: COMMERCIAL

## 2023-02-13 DIAGNOSIS — Z96.652 S/P TOTAL KNEE REPLACEMENT, LEFT: ICD-10-CM

## 2023-02-13 DIAGNOSIS — R29.898 WEAKNESS OF LEFT LEG: ICD-10-CM

## 2023-02-13 DIAGNOSIS — M25.662 DECREASED RANGE OF MOTION (ROM) OF LEFT KNEE: Primary | ICD-10-CM

## 2023-02-13 DIAGNOSIS — M25.562 ACUTE PAIN OF LEFT KNEE: ICD-10-CM

## 2023-02-13 PROCEDURE — 97110 THERAPEUTIC EXERCISES: CPT | Mod: CQ

## 2023-02-13 PROCEDURE — 97112 NEUROMUSCULAR REEDUCATION: CPT | Mod: CQ

## 2023-02-13 PROCEDURE — 97140 MANUAL THERAPY 1/> REGIONS: CPT | Mod: CQ

## 2023-02-13 NOTE — PROGRESS NOTES
[facility]   Physical Therapy Treatment Note     Name: Jonathan Loera  Clinic Number: [id number]    Visit Date: 2/13/2023    Therapy Diagnosis: Left TKA  Physician: Damir Blakely III, MD     Physician Orders: PT Eval and Treat   Medical Diagnosis from Referral: Left TKA  Evaluation Date: 1/4/2023  DOS : 11/30/2022  Authorization Period Expiration: 12/19/2023  Plan of Care Expiration: 3/3/2023    Visit # / Visits authorized: 11 / 18  PTA Visit #: 2/5     Time In: 11:18 am  Time Out: 12:12 pm  Total Billable Time: 54 minutes    SUBJECTIVE     Pt reports:soreness and some pain  He was compliant with home exercise program.  Response to previous treatment: Soreness   Functional change: He is on crutches now rather than a walker    Pain: 1/10 at rest; 4-5/10 with Stretches   Location: Left knee      Prior Level of Function: Independent with his job  Current Level of Function: Requires use of walker for ambulation.     OBJECTIVE     Objective Measures updated at progress report unless specified.   2/13/2023   Active Flexion: 100  Passive Flexion: 112  Active Extension: -8 following E Stim   Passive Extension: -3 with Max Over pressure     Treatment     JONATHAN received the treatments listed below:      therapeutic exercises to develop strength, endurance, and ROM for 10 minutes including:  Bike x 5 minutes (rocking F/R)  SB 5x20 seconds  Hamstring Stretch on Stairs 5x20 seconds  Knee Flexion Stretch on Stairs 10x10  seconds  Prone quad with belt - 10x10 seconds  Standing Rev Lunge Stretch 20x    Exercises Added on 1/18/2023 :     Cybex Knee Extension 3 x 10 with 3 Plates   Cybex Hamstring Curls 3 x 10 with 5 Plates      Supine :   Quad Sets 40x  Heel Slides 20x  Straight Leg Raises 30x  Hip Abduction   Bridging      Standing :  Standing Marches  Squat to chair/Shallow standing knee bends  Hip Abduction   Reverse TERMINAL KNEE EXTENSION 4 plates 30x     Single Leg Stance - Firm   Single  Leg Stance - Foam     manual therapy techniques: Joint mobilizations, Manual traction, and Myofacial release were applied to the: knee for 10 minutes, including:  Manual Flexion Progression  Manual Extension Mobs  Patellar Mobs  Prone Quad MET    neuromuscular re-education activities to improve: Balance, Coordination, Kinesthetic, and Proprioception for 23 minutes. The following activities were included:  (10 on/20 off, 2 second ramp)  Quad sets with Russian E-stim x 7.5 minutes   Short Arc Quads with Belt with Russian E-stim x 7.5 minutes  LAQ with Tristanian E-Stim 7.5 minutes  Prone Quad Sets x 5 minutes with Russian E-stim  Cybex Leg Press Bilateral 2 x 10 with 11 Plates with rock onto heels  Cybex Leg Press Left Only 3 x 10 with 5 Plates with rock onto heels  Cybex Knee Extension with Eccentric Down  - 3 plates 20x      therapeutic activities to improve functional performance for 0  minutes, including:      gait training to improve functional mobility and safety for 0  minutes, including:    Patient received the following supervised modalities after being cleared for contradictions: Pre-Mod Electrical Stimulation:  Patient received Pre-Mod Electrical Stimulation for pain control applied to the Left Knee. Pt received stimulation at a frequency of  Hz for 0 minutes. Patient tolerated treatment well without any adverse effects.       Patient received Vasopneumatic pump via Game Ready to Left Knee x 0 Min       Patient Education and Home Exercises     Home Exercises Provided and Patient Education Provided     Education provided:   - None    Written Home Exercises Provided: Patient instructed to cont prior HEP. Exercises were reviewed and JONATHAN was able to demonstrate them prior to the end of the session.  JONATHAN demonstrated good  understanding of the education provided. See EMR under Patient Instructions for exercises provided during therapy sessions    ASSESSMENT     Pt's flexion RANGE OF MOTION continues to make  gradual improvements. Quad function is improving; however, pt actively avoids full knee extension during exercises and requires mod cues on correct form. Added open chain quad today to isolate quads more with focus on eccentrics. Pt's extension RANGE OF MOTION is remaining unchanged. Will continue to work on this in here. Educated pt to be diligent with his HEP.           PMH:  Jonathan is a 57 y.o. male referred to outpatient Physical Therapy with a medical diagnosis of Left TKA. JONATHAN reports: He had severe OA in the Left Knee for years and subsequently underwent a Left TKA on 11/30/2022. He has been receiving Home Health Physical Therapy but Dr Blakely is worried about his Range of Motion and has referred patient to begin Outpatient Physical Therapy.   Patient presents with decreased Left knee Range of Motion with Flexion limited to 80 degrees and Extension at -10 degrees from zero  at time of Evaluation. Patient keeps knee flexed at approx 60 degrees most of the time as this appears to be his position of comfort. Patient is unable to flex the knee to 90 degrees and therefore sit to stand is difficult. He requires use of arms and additional time to perform sit to stand. Patient requires use of a rolling walker for ambulation. Patient is noted to have decreased stance time and antalgic gait on the Left. Patient has decreased heel strike and hits foot flat due to lack of Full Knee Extension with initial contact on the Left. Patient has decreased step/stride length resulting in slow eliana. A Home Exercise Program was established today. All exercises were reviewed with the patient but this will need to be reinforced due to decreased health literacy. Patient will benefit from skilled Physical Therapy intervention to address all deficits and to help him return to his prior level of function.       JONATHAN Is progressing towards his goals.   Pt prognosis is Good.     Pt will continue to benefit from skilled outpatient physical  therapy to address the deficits listed in the problem list box on initial evaluation, provide pt/family education and to maximize pt's level of independence in the home and community environment.     Pt's spiritual, cultural and educational needs considered and pt agreeable to plan of care and goals.     Anticipated barriers to physical therapy: None    Goals: Short Term Goals: 4 weeks   1. Independent with Home Exercise Program   2. Increase Left Knee Range of Motion to 0 Degrees to 100 Degrees  3. Increase Left Knee Strength to grossly 4/5  4. Patient will ambulate 500 feet without an assistive device with complaints of pain Less than or Equal to 4/10.       Long Term Goals: 8 weeks   1. Patient will increase Left Knee Strength to grossly 4+/5  2. Patient will increase Left Knee Range of Motion to 0 Degrees to 120 Degrees   3. Patient will ambulate 1000+ feet with step length more equal from Left to Right and with complaints of pain Less than or Equal to 2/10.     PLAN     Plan of care Certification: 1/4/2023 to 3/3/2023.     Outpatient Physical Therapy 2 times weekly for 8 weeks to include the following interventions: Electrical Stimulation to increase strength and decrease pain and inflammation as needed, Gait Training, Manual Therapy, Moist Heat/ Ice, Neuromuscular Re-ed, Patient Education, Therapeutic Activities, and Therapeutic Exercise.        Meliton Damian, PTA  02/13/2023

## 2023-02-15 ENCOUNTER — CLINICAL SUPPORT (OUTPATIENT)
Dept: REHABILITATION | Facility: HOSPITAL | Age: 58
End: 2023-02-15
Payer: COMMERCIAL

## 2023-02-15 DIAGNOSIS — R29.898 WEAKNESS OF LEFT LEG: ICD-10-CM

## 2023-02-15 DIAGNOSIS — Z96.652 S/P TOTAL KNEE REPLACEMENT, LEFT: ICD-10-CM

## 2023-02-15 DIAGNOSIS — M25.662 DECREASED RANGE OF MOTION (ROM) OF LEFT KNEE: Primary | ICD-10-CM

## 2023-02-15 DIAGNOSIS — M25.562 ACUTE PAIN OF LEFT KNEE: ICD-10-CM

## 2023-02-15 PROCEDURE — 97110 THERAPEUTIC EXERCISES: CPT | Mod: CQ

## 2023-02-15 PROCEDURE — 97112 NEUROMUSCULAR REEDUCATION: CPT | Mod: CQ

## 2023-02-15 PROCEDURE — 97140 MANUAL THERAPY 1/> REGIONS: CPT | Mod: CQ

## 2023-02-15 NOTE — PROGRESS NOTES
[facility]   Physical Therapy Treatment Note     Name: Jonathna Loera  Clinic Number: [id number]    Visit Date: 2/15/2023    Therapy Diagnosis: Left TKA  Physician: Damir Blakely III, MD     Physician Orders: PT Eval and Treat   Medical Diagnosis from Referral: Left TKA  Evaluation Date: 1/4/2023  DOS : 11/30/2022  Authorization Period Expiration: 12/19/2023  Plan of Care Expiration: 3/3/2023    Visit # / Visits authorized: 12 / 18  PTA Visit #: 3/5     Time In: 10:55 am  Time Out: 11:41 am  Total Billable Time: 46 minutes    SUBJECTIVE     Pt reports:sore after last session  He was compliant with home exercise program.  Response to previous treatment: Soreness and had to take pain pill  Functional change: He is on crutches now rather than a walker    Pain: 1/10 at rest; 4-5/10 with Stretches   Location: Left knee      Prior Level of Function: Independent with his job  Current Level of Function: Requires use of walker for ambulation.     OBJECTIVE     Objective Measures updated at progress report unless specified.   2/15/2023   Active Flexion: 100  Passive Flexion: 112  Active Extension: -8 following E Stim NO CHANGE TODAY  Passive Extension: -3 with Max Over pressure   NO CHANGE TODAY    Treatment     JONATHAN received the treatments listed below:      therapeutic exercises to develop strength, endurance, and ROM for 15 minutes including:  Bike x 5 minutes (rocking F/R)  SB 5x20 seconds  Hamstring Stretch Longsitting with Overpressure 5x20 seconds  Sustained Knee Extension 6# 3 minutes  Knee Flexion Stretch on Stairs 10x10  seconds  Prone quad with belt - 10x10 seconds  Standing Rev Lunge Stretch 20x    Exercises Added on 1/18/2023 :     Cybex Knee Extension 3 x 10 with 3 Plates   Cybex Hamstring Curls 3 x 10 with 5 Plates      Supine :   Quad Sets 40x  Heel Slides 20x  Straight Leg Raises 30x  Hip Abduction   Bridging      Standing :  Standing Marches  Squat to chair/Shallow  "standing knee bends  Hip Abduction   Reverse TERMINAL KNEE EXTENSION 4 plates 30x     Single Leg Stance - Firm   Single Leg Stance - Foam     manual therapy techniques: Joint mobilizations, Manual traction, and Myofacial release were applied to the: knee for 8 minutes, including:  Manual Flexion Progression  Manual Extension Mobs  Patellar Mobs  Prone Quad MET    neuromuscular re-education activities to improve: Balance, Coordination, Kinesthetic, and Proprioception for 23 minutes. The following activities were included:    Short Arc Quads with Belt with Russian E-stim x 5 minutes (10 on/20 off, 2 second ramp)  SLR with Russian E-Stim x 5 minutes (4 sec on/12 sec off, 2 second ramp)  LAQ with Belgian E-Stim 7.5 minutes  Prone Quad Sets x 5 minutes with Russian E-stim  Cybex Leg Press Bilateral 2 x 10 with 11 Plates with rock onto heels  Cybex Leg Press Left Only 3 x 10 with 5 Plates with rock onto heels  Cybex Knee Extension with Eccentric Down  - 3 plates 20x  Lateral Steps 6" 20x eccentric down      therapeutic activities to improve functional performance for 0  minutes, including:      gait training to improve functional mobility and safety for 0  minutes, including:    Patient received the following supervised modalities after being cleared for contradictions: Pre-Mod Electrical Stimulation:  Patient received Pre-Mod Electrical Stimulation for pain control applied to the Left Knee. Pt received stimulation at a frequency of  Hz for 0 minutes. Patient tolerated treatment well without any adverse effects.       Patient received Vasopneumatic pump via Game Ready to Left Knee x 0 Min       Patient Education and Home Exercises     Home Exercises Provided and Patient Education Provided     Education provided:   - None    Written Home Exercises Provided: Patient instructed to cont prior HEP. Exercises were reviewed and JONATHAN was able to demonstrate them prior to the end of the session.  JONATHAN demonstrated good  " understanding of the education provided. See EMR under Patient Instructions for exercises provided during therapy sessions    ASSESSMENT     Spent majority of time working on quad control and working on getting knee into TERMINAL KNEE EXTENSION. Pt is able to perform more fluid closed/open chain exercises with fatigue noted. Pt's extension has not made any progress lately. Educated pt on importance of being diligent with his HEP. Will discuss with PT about possible D/C of patient soon.           PMH:  Jonathan is a 57 y.o. male referred to outpatient Physical Therapy with a medical diagnosis of Left TKA. JONATHAN reports: He had severe OA in the Left Knee for years and subsequently underwent a Left TKA on 11/30/2022. He has been receiving Home Health Physical Therapy but Dr Blakely is worried about his Range of Motion and has referred patient to begin Outpatient Physical Therapy.   Patient presents with decreased Left knee Range of Motion with Flexion limited to 80 degrees and Extension at -10 degrees from zero  at time of Evaluation. Patient keeps knee flexed at approx 60 degrees most of the time as this appears to be his position of comfort. Patient is unable to flex the knee to 90 degrees and therefore sit to stand is difficult. He requires use of arms and additional time to perform sit to stand. Patient requires use of a rolling walker for ambulation. Patient is noted to have decreased stance time and antalgic gait on the Left. Patient has decreased heel strike and hits foot flat due to lack of Full Knee Extension with initial contact on the Left. Patient has decreased step/stride length resulting in slow eliana. A Home Exercise Program was established today. All exercises were reviewed with the patient but this will need to be reinforced due to decreased health literacy. Patient will benefit from skilled Physical Therapy intervention to address all deficits and to help him return to his prior level of function.       RAY  Is progressing towards his goals.   Pt prognosis is Good.     Pt will continue to benefit from skilled outpatient physical therapy to address the deficits listed in the problem list box on initial evaluation, provide pt/family education and to maximize pt's level of independence in the home and community environment.     Pt's spiritual, cultural and educational needs considered and pt agreeable to plan of care and goals.     Anticipated barriers to physical therapy: None    Goals: Short Term Goals: 4 weeks   1. Independent with Home Exercise Program   2. Increase Left Knee Range of Motion to 0 Degrees to 100 Degrees  3. Increase Left Knee Strength to grossly 4/5  4. Patient will ambulate 500 feet without an assistive device with complaints of pain Less than or Equal to 4/10.       Long Term Goals: 8 weeks   1. Patient will increase Left Knee Strength to grossly 4+/5  2. Patient will increase Left Knee Range of Motion to 0 Degrees to 120 Degrees   3. Patient will ambulate 1000+ feet with step length more equal from Left to Right and with complaints of pain Less than or Equal to 2/10.     PLAN     Plan of care Certification: 1/4/2023 to 3/3/2023.     Outpatient Physical Therapy 2 times weekly for 8 weeks to include the following interventions: Electrical Stimulation to increase strength and decrease pain and inflammation as needed, Gait Training, Manual Therapy, Moist Heat/ Ice, Neuromuscular Re-ed, Patient Education, Therapeutic Activities, and Therapeutic Exercise.        Meliton Damian, PTA  02/15/2023

## 2023-02-20 ENCOUNTER — CLINICAL SUPPORT (OUTPATIENT)
Dept: REHABILITATION | Facility: HOSPITAL | Age: 58
End: 2023-02-20
Payer: COMMERCIAL

## 2023-02-20 DIAGNOSIS — R29.898 WEAKNESS OF LEFT LEG: ICD-10-CM

## 2023-02-20 DIAGNOSIS — Z96.652 S/P TOTAL KNEE REPLACEMENT, LEFT: ICD-10-CM

## 2023-02-20 DIAGNOSIS — M25.662 DECREASED RANGE OF MOTION (ROM) OF LEFT KNEE: Primary | ICD-10-CM

## 2023-02-20 DIAGNOSIS — M25.562 ACUTE PAIN OF LEFT KNEE: ICD-10-CM

## 2023-02-20 PROCEDURE — 97014 ELECTRIC STIMULATION THERAPY: CPT

## 2023-02-20 PROCEDURE — 97112 NEUROMUSCULAR REEDUCATION: CPT

## 2023-02-20 PROCEDURE — 97110 THERAPEUTIC EXERCISES: CPT

## 2023-02-20 PROCEDURE — 97140 MANUAL THERAPY 1/> REGIONS: CPT

## 2023-02-20 NOTE — PROGRESS NOTES
[facility]   Physical Therapy Treatment Note     Name: Jonathan Loera  Clinic Number: [id number]    Visit Date: 2/20/2023    Therapy Diagnosis: Left TKA  Physician: Damir Blakely III, MD     Physician Orders: PT Eval and Treat   Medical Diagnosis from Referral: Left TKA  Evaluation Date: 1/4/2023  DOS : 11/30/2022  Authorization Period Expiration: 12/19/2023  Plan of Care Expiration: 3/3/2023    Visit # / Visits authorized: 13 / 18  PTA Visit #: 3/5     Time In: 10:55 am  Time Out: 11:58 am  Total Billable Time: 60 minutes    SUBJECTIVE     Pt reports: He has been trying to walk more. He is ready to get back to work.   He was compliant with home exercise program.  Response to previous treatment: Soreness and had to take pain pill  Functional change: He is on crutches now rather than a walker    Pain: 1/10 at rest; 4-5/10 with Stretches   Location: Left knee      Prior Level of Function: Independent with his job  Current Level of Function: Requires use of walker for ambulation.     OBJECTIVE     Objective Measures updated at progress report unless specified.   2/20/2023   Active Flexion: 100  Passive Flexion: 112  Active Extension: -8 following E Stim NO CHANGE TODAY  Passive Extension: -3 with Max Over pressure   NO CHANGE TODAY    Treatment     JONATHAN received the treatments listed below:      therapeutic exercises to develop strength, endurance, and ROM for 15 minutes including:  Bike x 5 minutes (rocking F/R)  SB 5x20 seconds  Hamstring Stretch Longsitting with Overpressure 5x20 seconds  Sustained Knee Extension 6# 3 minutes  Knee Flexion Stretch on Stairs 10x10  seconds  Prone quad with belt - 10x10 seconds  Standing Rev Lunge Stretch 20x    Exercises Added on 1/18/2023 :     Cybex Knee Extension 3 x 10 with 3 Plates   Cybex Hamstring Curls 3 x 10 with 5 Plates      Supine :   Quad Sets 40x  Heel Slides 20x  Straight Leg Raises 30x  Hip Abduction   Bridging      Standing  ":  Standing Marches  Squat to chair/Shallow standing knee bends  Hip Abduction   Reverse TERMINAL KNEE EXTENSION 4 plates 30x     Single Leg Stance - Firm   Single Leg Stance - Foam     manual therapy techniques: Joint mobilizations, Manual traction, and Myofacial release were applied to the: knee for 12 minutes, including:  Manual Flexion Progression  Manual Extension Mobs  Patellar Mobs  Prone Quad MET    neuromuscular re-education activities to improve: Balance, Coordination, Kinesthetic, and Proprioception for 23 minutes. The following activities were included:   QUAD SET with Russian E Stim x 5 minutes (4 sec on/12 sec off)   Short Arc Quads with Belt with Russian E-stim x 5 minutes (10 on/20 off, 2 second ramp)  SLR with Russian E-Stim x 5 minutes (4 sec on/12 sec off, 2 second ramp)  LAQ with [place] E-Stim 7.5 minutes  Prone Quad Sets x 5 minutes with Russian E-stim  Cybex Leg Press Bilateral 2 x 10 with 11 Plates with rock onto heels  Cybex Leg Press Left Only 3 x 10 with 5 Plates with rock onto heels  Cybex Knee Extension with Eccentric Down  - 3 plates 20x  Lateral Steps 6" 20x eccentric down      therapeutic activities to improve functional performance for 0  minutes, including:      gait training to improve functional mobility and safety for 0  minutes, including:    Patient received the following supervised modalities after being cleared for contradictions: Pre-Mod Electrical Stimulation:  Patient received Pre-Mod Electrical Stimulation for pain control applied to the Left Knee. Pt received stimulation at a frequency of  Hz for 10 minutes. Patient tolerated treatment well without any adverse effects.   Had patient propped in Knee Extension for prolonged Stretch with Ice and a 10 lb weight while E Stim was running.     Patient received Vasopneumatic pump via Game Ready to Left Knee x 0 Min       Patient Education and Home Exercises     Home Exercises Provided and Patient Education Provided "     Education provided:   - None    Written Home Exercises Provided: Patient instructed to cont prior HEP. Exercises were reviewed and JONATHAN was able to demonstrate them prior to the end of the session.  JONATHAN demonstrated good  understanding of the education provided. See EMR under Patient Instructions for exercises provided during therapy sessions    ASSESSMENT     Patient reports he is ready to get back to work. He is allowed 90 days off work and he must return to work on Monday 2/27/2023. He has a follow up appointment with Dr Tereso Garay this Thursday, 2/23/2023. He is hoping that Dr Garay will be able to write a work release. Therapy is planning to Discharge patient on Wednesday in preparation for him to return to work next week. Therapist will provide him with a Home Exercise Program so he can continue to work on Range of Motion and Strengthening at home. Patient continues to have decreased Left Knee Flexion and Extension. We are working to improve Strength and Range of Motion. Ended session with Pre-Mod to Left Knee. Had patient propped in Knee Extension for prolonged Stretch with Ice and a 10 lb weight while E Stim was running.              PMH:  Jonathan is a 57 y.o. male referred to outpatient Physical Therapy with a medical diagnosis of Left TKA. JONATHAN reports: He had severe OA in the Left Knee for years and subsequently underwent a Left TKA on 11/30/2022. He has been receiving Home Health Physical Therapy but Dr Blakely is worried about his Range of Motion and has referred patient to begin Outpatient Physical Therapy.   Patient presents with decreased Left knee Range of Motion with Flexion limited to 80 degrees and Extension at -10 degrees from zero  at time of Evaluation. Patient keeps knee flexed at approx 60 degrees most of the time as this appears to be his position of comfort. Patient is unable to flex the knee to 90 degrees and therefore sit to stand is difficult. He requires use of arms and additional time  to perform sit to stand. Patient requires use of a rolling walker for ambulation. Patient is noted to have decreased stance time and antalgic gait on the Left. Patient has decreased heel strike and hits foot flat due to lack of Full Knee Extension with initial contact on the Left. Patient has decreased step/stride length resulting in slow eliana. A Home Exercise Program was established today. All exercises were reviewed with the patient but this will need to be reinforced due to decreased health literacy. Patient will benefit from skilled Physical Therapy intervention to address all deficits and to help him return to his prior level of function.       RAY Is progressing towards his goals.   Pt prognosis is Good.     Pt will continue to benefit from skilled outpatient physical therapy to address the deficits listed in the problem list box on initial evaluation, provide pt/family education and to maximize pt's level of independence in the home and community environment.     Pt's spiritual, cultural and educational needs considered and pt agreeable to plan of care and goals.     Anticipated barriers to physical therapy: None    Goals: Short Term Goals: 4 weeks   1. Independent with Home Exercise Program   2. Increase Left Knee Range of Motion to 0 Degrees to 100 Degrees  3. Increase Left Knee Strength to grossly 4/5  4. Patient will ambulate 500 feet without an assistive device with complaints of pain Less than or Equal to 4/10.       Long Term Goals: 8 weeks   1. Patient will increase Left Knee Strength to grossly 4+/5  2. Patient will increase Left Knee Range of Motion to 0 Degrees to 120 Degrees   3. Patient will ambulate 1000+ feet with step length more equal from Left to Right and with complaints of pain Less than or Equal to 2/10.     PLAN     Plan of care Certification: 1/4/2023 to 3/3/2023.     Outpatient Physical Therapy 2 times weekly for 8 weeks to include the following interventions: Electrical Stimulation  to increase strength and decrease pain and inflammation as needed, Gait Training, Manual Therapy, Moist Heat/ Ice, Neuromuscular Re-ed, Patient Education, Therapeutic Activities, and Therapeutic Exercise.        MARLENE RICHEY, PT, DPT   02/20/2023

## 2023-02-22 ENCOUNTER — CLINICAL SUPPORT (OUTPATIENT)
Dept: REHABILITATION | Facility: HOSPITAL | Age: 58
End: 2023-02-22
Payer: COMMERCIAL

## 2023-02-22 DIAGNOSIS — R29.898 WEAKNESS OF LEFT LEG: ICD-10-CM

## 2023-02-22 DIAGNOSIS — M25.662 DECREASED RANGE OF MOTION (ROM) OF LEFT KNEE: Primary | ICD-10-CM

## 2023-02-22 DIAGNOSIS — M25.562 ACUTE PAIN OF LEFT KNEE: ICD-10-CM

## 2023-02-22 DIAGNOSIS — Z96.652 S/P TOTAL KNEE REPLACEMENT, LEFT: ICD-10-CM

## 2023-02-22 PROCEDURE — 97140 MANUAL THERAPY 1/> REGIONS: CPT

## 2023-02-22 PROCEDURE — 97112 NEUROMUSCULAR REEDUCATION: CPT

## 2023-02-22 PROCEDURE — 97110 THERAPEUTIC EXERCISES: CPT

## 2023-02-22 NOTE — PLAN OF CARE
[facility]   Physical Therapy Discharge Summary    Name: Jonathan Loera  Clinic Number: [id number]    Visit Date: 2/22/2023    Therapy Diagnosis: Left TKA  Physician: Damir Blakely III, MD     Physician Orders: PT Eval and Treat   Medical Diagnosis from Referral: Left TKA  Evaluation Date: 1/4/2023  DOS : 11/30/2022  Authorization Period Expiration: 12/19/2023  Plan of Care Expiration: 3/3/2023    Visit # / Visits authorized: 14 / 18  PTA Visit #: 3/5     Time In: 10:40 am  Time Out: 11:30 am  Total Billable Time: 50 minutes     SUBJECTIVE     Pt reports: He wants to be discharged from Therapy because he is supposed to return to work Monday  He was compliant with home exercise program.  Response to previous treatment: Soreness and had to take pain pill  Functional change: He is on crutches now rather than a walker    Pain: 1/10 at rest; 4-5/10 with Stretches   Location: Left knee      Prior Level of Function: Independent with his job  Current Level of Function: Requires use of walker for ambulation.     OBJECTIVE     Objective Measures updated at progress report unless specified.   2/22/2023   Active Flexion: 105  Passive Flexion: 115  Active Extension: -8 following E Stim NO CHANGE TODAY  Passive Extension: -3 with Max Over pressure   NO CHANGE TODAY    Treatment     JONATHAN received the treatments listed below:      therapeutic exercises to develop strength, endurance, and ROM for 15 minutes including:  Bike x 5 minutes (rocking F/R)  SB 5x20 seconds  Hamstring Stretch Longsitting with Overpressure 5x20 seconds  Sustained Knee Extension 6# 3 minutes  Knee Flexion Stretch on Stairs 10x10  seconds  Prone quad with belt - 10x10 seconds  Standing Rev Lunge Stretch 20x    Exercises Added on 1/18/2023 :     Cybex Knee Extension 3 x 10 with 3 Plates   Cybex Hamstring Curls 3 x 10 with 5 Plates      Supine :   Quad Sets 40x  Heel Slides 20x  Straight Leg Raises 30x  Hip Abduction  "  Bridging      Standing :  Standing Marches  Squat to chair/Shallow standing knee bends  Hip Abduction   Reverse TERMINAL KNEE EXTENSION 4 plates 30x     Single Leg Stance - Firm   Single Leg Stance - Foam     manual therapy techniques: Joint mobilizations, Manual traction, and Myofacial release were applied to the: knee for 12 minutes, including:  Manual Flexion Progression  Manual Extension Mobs  Patellar Mobs  Prone Quad MET    neuromuscular re-education activities to improve: Balance, Coordination, Kinesthetic, and Proprioception for 23 minutes. The following activities were included:   QUAD SET with Russian E Stim x 5 minutes (4 sec on/12 sec off)   Short Arc Quads with Belt with Russian E-stim x 5 minutes (10 on/20 off, 2 second ramp)  SLR with Russian E-Stim x 5 minutes (4 sec on/12 sec off, 2 second ramp)  LAQ with [place] E-Stim 7.5 minutes  Prone Quad Sets x 5 minutes with Russian E-stim  Cybex Leg Press Bilateral 2 x 10 with 11 Plates with rock onto heels  Cybex Leg Press Left Only 3 x 10 with 5 Plates with rock onto heels  Cybex Knee Extension with Eccentric Down  - 3 plates 20x  Lateral Steps 6" 20x eccentric down      therapeutic activities to improve functional performance for 0  minutes, including:      gait training to improve functional mobility and safety for 0  minutes, including:    Patient received the following supervised modalities after being cleared for contradictions: Pre-Mod Electrical Stimulation:  Patient received Pre-Mod Electrical Stimulation for pain control applied to the Left Knee. Pt received stimulation at a frequency of  Hz for 0 minutes. Patient tolerated treatment well without any adverse effects.   Had patient propped in Knee Extension for prolonged Stretch with Ice and a 10 lb weight while E Stim was running.     Patient received Vasopneumatic pump via Game Ready to Left Knee x 0 Min       Patient Education and Home Exercises     Home Exercises Provided and Patient " Education Provided     Education provided:   - Updated his Home Exercise Program in preparation for discharge     Written Home Exercises Provided: Patient instructed on new HEP. Exercises were reviewed and JONATHAN was able to demonstrate them prior to the end of the session.  JONATHAN demonstrated good  understanding of the education provided. See EMR under Patient Instructions for exercises provided 2/22/2023.    ASSESSMENT     Patient underwent Left TKA on 11/30/2022. He was Evaluated by Physical Therapy on 1/4/2023. He has received 14 visits with Physical Therapy.   Patient reports he is ready to get back to work. He is allowed 90 days off work and he must return to work on Monday 2/27/2023. He has a follow up appointment with Dr Tereso Garay tomorrow, 2/23/2023. He is hoping that Dr Garay will be able to write a work release. Therapy has performed aggressive stretching of the Left Knee but he continues to have Range of Motion deficits. Passive Flexion Range of Motion is 115 and Extension is -3. His Strength is now 5/5 and he is able to ambulate without an assistive device. Therapist's main concern remains the Range of Motion. Therapist established a detailed Home Exercise Program for continued stretching at home. Patient is discharged from Physical Therapy services at this time with all goals met other than those set for Range of Motion.               Goals: Short Term Goals: 4 weeks   1. Independent with Home Exercise Program - Goal Met   2. Increase Left Knee Range of Motion to 0 Degrees to 100 Degrees - Goal Not Met   3. Increase Left Knee Strength to grossly 4/5  - Goal Met   4. Patient will ambulate 500 feet without an assistive device with complaints of pain Less than or Equal to 4/10.   - Goal Met      Long Term Goals: 8 weeks   1. Patient will increase Left Knee Strength to grossly 4+/5  - Goal Met   2. Patient will increase Left Knee Range of Motion to 0 Degrees to 120 Degrees  - Goal Not Met   3. Patient will  ambulate 1000+ feet with step length more equal from Left to Right and with complaints of pain Less than or Equal to 2/10.  - Goal Met     Discharge reason: Patient returning to work     Plan   This patient is discharged from Physical Therapy.    Date of Last visit: 2/22/2023  Total Visits Received: 14  Cancelled Visits: 0  No Show Visits: 0    MARLENE H KAIDEN, PT, DPT

## 2023-02-22 NOTE — PROGRESS NOTES
[facility]   Physical Therapy Treatment Note     Name: Jonathan Loera  Clinic Number: [id number]    Visit Date: 2/22/2023    Therapy Diagnosis: Left TKA  Physician: Damir Blakely III, MD     Physician Orders: PT Eval and Treat   Medical Diagnosis from Referral: Left TKA  Evaluation Date: 1/4/2023  DOS : 11/30/2022  Authorization Period Expiration: 12/19/2023  Plan of Care Expiration: 3/3/2023    Visit # / Visits authorized: 14 / 18  PTA Visit #: 3/5     Time In: 10:40 am  Time Out: 11:30 am  Total Billable Time: 50 minutes     SUBJECTIVE     Pt reports: He wants to be discharged from Therapy because he is supposed to return to work Monday  He was compliant with home exercise program.  Response to previous treatment: Soreness and had to take pain pill  Functional change: He is on crutches now rather than a walker    Pain: 1/10 at rest; 4-5/10 with Stretches   Location: Left knee      Prior Level of Function: Independent with his job  Current Level of Function: Requires use of walker for ambulation.     OBJECTIVE     Objective Measures updated at progress report unless specified.   2/22/2023   Active Flexion: 105  Passive Flexion: 115  Active Extension: -8 following E Stim NO CHANGE TODAY  Passive Extension: -3 with Max Over pressure   NO CHANGE TODAY    Treatment     JONATHAN received the treatments listed below:      therapeutic exercises to develop strength, endurance, and ROM for 15 minutes including:  Bike x 5 minutes (rocking F/R)  SB 5x20 seconds  Hamstring Stretch Longsitting with Overpressure 5x20 seconds  Sustained Knee Extension 6# 3 minutes  Knee Flexion Stretch on Stairs 10x10  seconds  Prone quad with belt - 10x10 seconds  Standing Rev Lunge Stretch 20x    Exercises Added on 1/18/2023 :     Cybex Knee Extension 3 x 10 with 3 Plates   Cybex Hamstring Curls 3 x 10 with 5 Plates      Supine :   Quad Sets 40x  Heel Slides 20x  Straight Leg Raises 30x  Hip Abduction  "  Bridging      Standing :  Standing Marches  Squat to chair/Shallow standing knee bends  Hip Abduction   Reverse TERMINAL KNEE EXTENSION 4 plates 30x     Single Leg Stance - Firm   Single Leg Stance - Foam     manual therapy techniques: Joint mobilizations, Manual traction, and Myofacial release were applied to the: knee for 12 minutes, including:  Manual Flexion Progression  Manual Extension Mobs  Patellar Mobs  Prone Quad MET    neuromuscular re-education activities to improve: Balance, Coordination, Kinesthetic, and Proprioception for 23 minutes. The following activities were included:   QUAD SET with Russian E Stim x 5 minutes (4 sec on/12 sec off)   Short Arc Quads with Belt with Russian E-stim x 5 minutes (10 on/20 off, 2 second ramp)  SLR with Russian E-Stim x 5 minutes (4 sec on/12 sec off, 2 second ramp)  LAQ with Portuguese E-Stim 7.5 minutes  Prone Quad Sets x 5 minutes with Russian E-stim  Cybex Leg Press Bilateral 2 x 10 with 11 Plates with rock onto heels  Cybex Leg Press Left Only 3 x 10 with 5 Plates with rock onto heels  Cybex Knee Extension with Eccentric Down  - 3 plates 20x  Lateral Steps 6" 20x eccentric down      therapeutic activities to improve functional performance for 0  minutes, including:      gait training to improve functional mobility and safety for 0  minutes, including:    Patient received the following supervised modalities after being cleared for contradictions: Pre-Mod Electrical Stimulation:  Patient received Pre-Mod Electrical Stimulation for pain control applied to the Left Knee. Pt received stimulation at a frequency of  Hz for 0 minutes. Patient tolerated treatment well without any adverse effects.   Had patient propped in Knee Extension for prolonged Stretch with Ice and a 10 lb weight while E Stim was running.     Patient received Vasopneumatic pump via Game Ready to Left Knee x 0 Min       Patient Education and Home Exercises     Home Exercises Provided and Patient " Education Provided     Education provided:   - Updated his Home Exercise Program in preparation for discharge     Written Home Exercises Provided: Patient instructed on new HEP. Exercises were reviewed and JONATHAN was able to demonstrate them prior to the end of the session.  JONATHAN demonstrated good  understanding of the education provided. See EMR under Patient Instructions for exercises provided 2/22/2023.      ASSESSMENT     Patient underwent Left TKA on 11/30/2022. He was Evaluated by Physical Therapy on 1/4/2023. He has received 14 visits with Physical Therapy.   Patient reports he is ready to get back to work. He is allowed 90 days off work and he must return to work on Monday 2/27/2023. He has a follow up appointment with Dr Tereso Garay tomorrow, 2/23/2023. He is hoping that Dr Garay will be able to write a work release. Therapy has performed aggressive stretching of the Left Knee but he continues to have Range of Motion deficits. Passive Flexion Range of Motion is 115 and Extension is -3. His Strength is now 5/5 and he is able to ambulate without an assistive device. Therapist's main concern remains the Range of Motion. Therapist established a detailed Home Exercise Program for continued stretching at home. Patient is discharged from Physical Therapy services at this time with all goals met other than those set for Range of Motion.               Goals: Short Term Goals: 4 weeks   1. Independent with Home Exercise Program - Goal Met   2. Increase Left Knee Range of Motion to 0 Degrees to 100 Degrees - Goal Not Met   3. Increase Left Knee Strength to grossly 4/5  - Goal Met   4. Patient will ambulate 500 feet without an assistive device with complaints of pain Less than or Equal to 4/10.   - Goal Met      Long Term Goals: 8 weeks   1. Patient will increase Left Knee Strength to grossly 4+/5  - Goal Met   2. Patient will increase Left Knee Range of Motion to 0 Degrees to 120 Degrees  - Goal Not Met   3. Patient will  ambulate 1000+ feet with step length more equal from Left to Right and with complaints of pain Less than or Equal to 2/10.  - Goal Met     PLAN     Patient to be Discharged from Physical Therapy services following today's treatment. See Discharge Summary if needed.         MARLENE RICHEY, PT, DPT   02/22/2023

## 2023-02-23 ENCOUNTER — OFFICE VISIT (OUTPATIENT)
Dept: INTERNAL MEDICINE | Facility: CLINIC | Age: 58
End: 2023-02-23
Payer: COMMERCIAL

## 2023-02-23 VITALS
TEMPERATURE: 99 F | WEIGHT: 238 LBS | HEIGHT: 75 IN | RESPIRATION RATE: 18 BRPM | HEART RATE: 97 BPM | BODY MASS INDEX: 29.59 KG/M2 | OXYGEN SATURATION: 97 % | SYSTOLIC BLOOD PRESSURE: 124 MMHG | DIASTOLIC BLOOD PRESSURE: 79 MMHG

## 2023-02-23 DIAGNOSIS — E78.5 DYSLIPIDEMIA: ICD-10-CM

## 2023-02-23 DIAGNOSIS — I10 ESSENTIAL HYPERTENSION: ICD-10-CM

## 2023-02-23 DIAGNOSIS — I10 HYPERTENSION, UNSPECIFIED TYPE: ICD-10-CM

## 2023-02-23 DIAGNOSIS — Z09 FOLLOW-UP EXAM: Primary | ICD-10-CM

## 2023-02-23 DIAGNOSIS — Z96.652 S/P TOTAL KNEE ARTHROPLASTY, LEFT: ICD-10-CM

## 2023-02-23 DIAGNOSIS — N52.9 ERECTILE DYSFUNCTION, UNSPECIFIED ERECTILE DYSFUNCTION TYPE: ICD-10-CM

## 2023-02-23 DIAGNOSIS — I82.442 ACUTE DEEP VEIN THROMBOSIS (DVT) OF TIBIAL VEIN OF LEFT LOWER EXTREMITY: ICD-10-CM

## 2023-02-23 PROCEDURE — 3074F SYST BP LT 130 MM HG: CPT | Mod: ,,, | Performed by: INTERNAL MEDICINE

## 2023-02-23 PROCEDURE — 1159F PR MEDICATION LIST DOCUMENTED IN MEDICAL RECORD: ICD-10-PCS | Mod: ,,, | Performed by: INTERNAL MEDICINE

## 2023-02-23 PROCEDURE — 99214 PR OFFICE/OUTPT VISIT, EST, LEVL IV, 30-39 MIN: ICD-10-PCS | Mod: S$PBB,,, | Performed by: INTERNAL MEDICINE

## 2023-02-23 PROCEDURE — 3074F PR MOST RECENT SYSTOLIC BLOOD PRESSURE < 130 MM HG: ICD-10-PCS | Mod: ,,, | Performed by: INTERNAL MEDICINE

## 2023-02-23 PROCEDURE — 3008F PR BODY MASS INDEX (BMI) DOCUMENTED: ICD-10-PCS | Mod: ,,, | Performed by: INTERNAL MEDICINE

## 2023-02-23 PROCEDURE — 3078F PR MOST RECENT DIASTOLIC BLOOD PRESSURE < 80 MM HG: ICD-10-PCS | Mod: ,,, | Performed by: INTERNAL MEDICINE

## 2023-02-23 PROCEDURE — 99214 OFFICE O/P EST MOD 30 MIN: CPT | Mod: PBBFAC | Performed by: INTERNAL MEDICINE

## 2023-02-23 PROCEDURE — 3078F DIAST BP <80 MM HG: CPT | Mod: ,,, | Performed by: INTERNAL MEDICINE

## 2023-02-23 PROCEDURE — 99214 OFFICE O/P EST MOD 30 MIN: CPT | Mod: S$PBB,,, | Performed by: INTERNAL MEDICINE

## 2023-02-23 PROCEDURE — 1159F MED LIST DOCD IN RCRD: CPT | Mod: ,,, | Performed by: INTERNAL MEDICINE

## 2023-02-23 PROCEDURE — 3008F BODY MASS INDEX DOCD: CPT | Mod: ,,, | Performed by: INTERNAL MEDICINE

## 2023-02-23 RX ORDER — TADALAFIL 10 MG/1
10 TABLET ORAL DAILY PRN
Qty: 10 TABLET | Refills: 3 | Status: SHIPPED | OUTPATIENT
Start: 2023-02-23 | End: 2024-02-12 | Stop reason: SDUPTHER

## 2023-02-23 RX ORDER — ATORVASTATIN CALCIUM 20 MG/1
20 TABLET, FILM COATED ORAL DAILY
Qty: 90 TABLET | Refills: 3 | Status: SHIPPED | OUTPATIENT
Start: 2023-02-23 | End: 2024-02-12 | Stop reason: SDUPTHER

## 2023-02-23 NOTE — PROGRESS NOTES
Subjective:       Patient ID: Mukesh Loera is a 58 y.o. male.    Chief Complaint: Follow-up (Pt wants to be released to go back to work )    11/22/22 - The patient is a 57-year-old male the presents today for surgical preop risk stratification.  He is scheduled to undergo left total knee replacement on November 30th with Dr. Blakely.  He has a history of hypertension, dyslipidemia, low testosterone, and osteoarthritis.  No known history of coronary artery disease, cerebrovascular disease, CHF, or valvular heart disease.  He denies any chest pain at rest or with exertion.  He is able to perform greater than 4 metabolic equivalents without any symptoms.  No family history of premature coronary artery disease.  No issues with anesthesia in the past.  He denies any history of abnormal bleeding or clotting.  No known obstructive sleep apnea.  He is resting comfortably today.  He is afebrile and vital signs are stable.    2/23/23- Patient presents today for follow up. He has a known history of hypertension and dyslipidemia which are currently managed with amlodipine and atorvastatin. He reports proper compliance with these medications and denies any adverse effects at this time. BP today is 124/79. He recently underwent left knee replacement on 11/30/22 and reports completed physical therapy yesterday. He states he was also provided with information on exercises to continue therapy at home. He reports no issues with ambulation and plans to return to work soon. He reports that he is no longer taking the Mobic and intermittently takes Tylenol for any discomfort experienced in his left knee.  He is resting comfortably during this exam and denies any complaints at this time.     Follow-up  Associated symptoms include arthralgias. Pertinent negatives include no abdominal pain, chest pain, chills, coughing, fever, headaches, joint swelling, myalgias, nausea, neck pain, rash, sore throat or weakness.     Review of  Systems   Constitutional:  Negative for appetite change, chills and fever.   HENT:  Negative for ear pain, hearing loss, sinus pressure/congestion and sore throat.    Eyes:  Negative for pain, redness and visual disturbance.   Respiratory:  Negative for apnea, cough, shortness of breath and wheezing.    Cardiovascular:  Negative for chest pain, palpitations and leg swelling.   Gastrointestinal:  Negative for abdominal pain, blood in stool, constipation, diarrhea and nausea.   Endocrine: Negative for cold intolerance, heat intolerance and polyuria.   Genitourinary:  Negative for dysuria and hematuria.   Musculoskeletal:  Positive for arthralgias. Negative for back pain, joint swelling, myalgias and neck pain.   Integumentary:  Negative for pallor and rash.   Allergic/Immunologic: Negative for frequent infections.   Neurological:  Negative for tremors, seizures, weakness and headaches.   Hematological:  Negative for adenopathy.   Psychiatric/Behavioral:  Negative for confusion, dysphoric mood and sleep disturbance. The patient is not nervous/anxious.        Objective:      Physical Exam  Vitals and nursing note reviewed.   Constitutional:       General: He is not in acute distress.     Appearance: Normal appearance. He is not ill-appearing.   HENT:      Head: Normocephalic and atraumatic.      Right Ear: External ear normal.      Left Ear: External ear normal.      Nose: Nose normal.      Mouth/Throat:      Pharynx: Oropharynx is clear.   Eyes:      Extraocular Movements: Extraocular movements intact.      Conjunctiva/sclera: Conjunctivae normal.      Pupils: Pupils are equal, round, and reactive to light.   Neck:      Vascular: No carotid bruit.   Cardiovascular:      Rate and Rhythm: Normal rate and regular rhythm.      Pulses: Normal pulses.      Heart sounds: Normal heart sounds. No murmur heard.  Pulmonary:      Effort: No respiratory distress.      Breath sounds: Normal breath sounds. No wheezing or rales.    Abdominal:      General: Bowel sounds are normal.      Palpations: Abdomen is soft.   Musculoskeletal:         General: No tenderness. Normal range of motion.      Cervical back: Normal range of motion and neck supple.      Right lower leg: No edema.      Left lower leg: No edema.   Skin:     General: Skin is warm and dry.      Capillary Refill: Capillary refill takes less than 2 seconds.      Coloration: Skin is not pale.   Neurological:      General: No focal deficit present.      Mental Status: He is alert and oriented to person, place, and time.      Cranial Nerves: No cranial nerve deficit.      Motor: No weakness.      Gait: Gait normal.   Psychiatric:         Mood and Affect: Mood normal.         Judgment: Judgment normal.       Assessment:       Problem List Items Addressed This Visit          Cardiac/Vascular    Essential hypertension    Dyslipidemia       Renal/    Erectile dysfunction       Hematology    Acute deep vein thrombosis (DVT) of tibial vein       Orthopedic    S/P total knee arthroplasty, left     Other Visit Diagnoses       Follow-up exam    -  Primary              Plan:       Mr. Loera presents today for follow-up.  He is about 3 months out from his left total knee replacement.  We have reviewed lab work from December.  His A1c is 6% which puts him in prediabetic range.  We have discussed the importance of diet and exercise.  We will repeat that lab at his next visit    1. -Left knee osteoarthritis-  2/23/23- Surgery was completed without any complications. Patient has completed PT and resumed normal activity. OK from my standpoint for the patient to return to work.    2. 11/22/22 Essential hypertension-blood pressure is 138/73.  He is on amlodipine 10 mg daily.  Continue blood pressure medications perioperatively  2/23/23 BP today is 124/79.Continue current Amlodipine as directed.     3. 11/22/22- Dyslipidemia-stable on a statin    2/23/23- Recent Lipid panel from 12/1/22 was  reviewed. Continue current statin therapy.  HDL was 56, , triglycerides 67, total cholesterol 169.      Return to care in 6 months or sooner if needed

## 2023-02-24 ENCOUNTER — OFFICE VISIT (OUTPATIENT)
Dept: ORTHOPEDICS | Facility: CLINIC | Age: 58
End: 2023-02-24
Payer: COMMERCIAL

## 2023-02-24 DIAGNOSIS — Z09 FOLLOW-UP EXAMINATION, FOLLOWING OTHER SURGERY: Primary | ICD-10-CM

## 2023-02-24 PROCEDURE — 99212 OFFICE O/P EST SF 10 MIN: CPT | Mod: PBBFAC | Performed by: ORTHOPAEDIC SURGERY

## 2023-02-24 PROCEDURE — 99024 PR POST-OP FOLLOW-UP VISIT: ICD-10-PCS | Mod: ,,, | Performed by: ORTHOPAEDIC SURGERY

## 2023-02-24 PROCEDURE — 99024 POSTOP FOLLOW-UP VISIT: CPT | Mod: ,,, | Performed by: ORTHOPAEDIC SURGERY

## 2023-02-24 NOTE — PROGRESS NOTES
CC:    Chief Complaint   Patient presents with    Follow-up     LT TKR 11/30 (12 WEEKS)           Previos History :     History:  1/5/2023   Mukesh Loera is a 57 y.o.  status post 5 weeks out left total knee  comes in today is not stockings his calf is swollen he has not been to physical therapy since sometime in December also think home health was stopped and he had to lay get into therapy   Addendum patient's ultrasound came back as a non occluding thrombus of the popliteal vein we reached out to his Dr. Abdelrahman Gresham's clinic office they agreed to see him to get him started on blood thinners discussed with patient            History:  2/24/2023   Mukesh Loera is a 58 y.o.  status post 3 months out left total knee 11/30/2022 he was slow get his knee motion but he is slowly improving that he also had thrombus in his popliteal vein his medical doctors treating that        PE:   He is motion is 3 to about 95 incision looks good thigh and calf were soft      Radiology:  None today        Ass/Plan:  Will follow him up proximally a year from his surgery.        Damir Blakely III, MD    Subject to voice recognition errors,  transcription services are not allowed

## 2023-02-24 NOTE — LETTER
February 24, 2023      [facility]  [address] [place] [address]  Phone: [phone]  Fax: [phone]       Patient: Mukesh Loera   YOB: 1965  Date of Visit: 02/24/2023    To Whom It May Concern:    GREGORY Loera  was at [facility] on 02/24/2023. The patient may return to work/school on 2/27/23 with no restrictions. If you have any questions or concerns, or if I can be of further assistance, please do not hesitate to contact me.    Sincerely,    Devora Blakely III, M.D.

## 2023-03-03 ENCOUNTER — CLINICAL SUPPORT (OUTPATIENT)
Dept: PRIMARY CARE CLINIC | Facility: CLINIC | Age: 58
End: 2023-03-03

## 2023-03-03 DIAGNOSIS — Z02.89 ENCOUNTER FOR PHYSICAL EXAMINATION RELATED TO EMPLOYMENT: Primary | ICD-10-CM

## 2023-03-03 DIAGNOSIS — Z01.10 ENCOUNTER FOR HEARING EXAMINATION, UNSPECIFIED WHETHER ABNORMAL FINDINGS: ICD-10-CM

## 2023-03-03 DIAGNOSIS — Z02.83 ENCOUNTER FOR DRUG SCREENING: ICD-10-CM

## 2023-03-03 PROCEDURE — 99000 PR SPECIMEN HANDLING,DR OFF->LAB: ICD-10-PCS | Mod: ,,, | Performed by: NURSE PRACTITIONER

## 2023-03-03 PROCEDURE — 99499 UNLISTED E&M SERVICE: CPT | Mod: ,,, | Performed by: NURSE PRACTITIONER

## 2023-03-03 PROCEDURE — 99499 PR PHYSICAL - BASIC NON DOT/CDL: ICD-10-PCS | Mod: ,,, | Performed by: NURSE PRACTITIONER

## 2023-03-03 PROCEDURE — 82075 CHG ASSAY OF BREATH ETHANOL: ICD-10-PCS | Mod: ,,, | Performed by: NURSE PRACTITIONER

## 2023-03-03 PROCEDURE — 82075 ASSAY OF BREATH ETHANOL: CPT | Mod: ,,, | Performed by: NURSE PRACTITIONER

## 2023-03-03 PROCEDURE — 99000 SPECIMEN HANDLING OFFICE-LAB: CPT | Mod: ,,, | Performed by: NURSE PRACTITIONER

## 2023-03-03 NOTE — PROGRESS NOTES
Subjective:       Patient ID: Mukesh Loera is a 58 y.o. male.    Chief Complaint: No chief complaint on file.    HPI  Review of Systems      Objective:      Physical Exam    Assessment:       Problem List Items Addressed This Visit    None  Visit Diagnoses       Encounter for physical examination related to employment    -  Primary    Encounter for hearing examination, unspecified whether abnormal findings        Encounter for drug screening                Plan:       See scanned documents in .

## 2023-03-13 PROBLEM — Z00.00 HEALTHCARE MAINTENANCE: Status: RESOLVED | Noted: 2022-12-12 | Resolved: 2023-03-13

## 2023-12-17 DIAGNOSIS — I10 HYPERTENSION, UNSPECIFIED TYPE: ICD-10-CM

## 2023-12-20 RX ORDER — AMLODIPINE BESYLATE 10 MG/1
10 TABLET ORAL
Qty: 90 TABLET | Refills: 3 | Status: SHIPPED | OUTPATIENT
Start: 2023-12-20 | End: 2024-02-12 | Stop reason: SDUPTHER

## 2024-02-12 ENCOUNTER — OFFICE VISIT (OUTPATIENT)
Dept: FAMILY MEDICINE | Facility: CLINIC | Age: 59
End: 2024-02-12
Payer: COMMERCIAL

## 2024-02-12 VITALS
HEART RATE: 68 BPM | WEIGHT: 247.63 LBS | SYSTOLIC BLOOD PRESSURE: 120 MMHG | BODY MASS INDEX: 30.95 KG/M2 | OXYGEN SATURATION: 99 % | RESPIRATION RATE: 20 BRPM | DIASTOLIC BLOOD PRESSURE: 78 MMHG

## 2024-02-12 DIAGNOSIS — Z12.11 SCREENING FOR MALIGNANT NEOPLASM OF COLON: ICD-10-CM

## 2024-02-12 DIAGNOSIS — N52.9 ERECTILE DYSFUNCTION, UNSPECIFIED ERECTILE DYSFUNCTION TYPE: ICD-10-CM

## 2024-02-12 DIAGNOSIS — I10 ESSENTIAL HYPERTENSION: Primary | ICD-10-CM

## 2024-02-12 DIAGNOSIS — E78.5 DYSLIPIDEMIA: ICD-10-CM

## 2024-02-12 DIAGNOSIS — R79.89 LOW SERUM THYROID STIMULATING HORMONE (TSH): ICD-10-CM

## 2024-02-12 DIAGNOSIS — Z12.5 SCREENING FOR PROSTATE CANCER: ICD-10-CM

## 2024-02-12 DIAGNOSIS — R73.9 HYPERGLYCEMIA: ICD-10-CM

## 2024-02-12 DIAGNOSIS — R79.89 LOW TSH LEVEL: ICD-10-CM

## 2024-02-12 PROBLEM — I82.449 ACUTE DEEP VEIN THROMBOSIS (DVT) OF TIBIAL VEIN: Status: RESOLVED | Noted: 2023-01-05 | Resolved: 2024-02-12

## 2024-02-12 LAB
ALBUMIN SERPL BCP-MCNC: 3.9 G/DL (ref 3.5–5)
ALBUMIN/GLOB SERPL: 0.9 {RATIO}
ALP SERPL-CCNC: 96 U/L (ref 45–115)
ALT SERPL W P-5'-P-CCNC: 26 U/L (ref 16–61)
ANION GAP SERPL CALCULATED.3IONS-SCNC: 7 MMOL/L (ref 7–16)
AST SERPL W P-5'-P-CCNC: 13 U/L (ref 15–37)
BASOPHILS # BLD AUTO: 0.05 K/UL (ref 0–0.2)
BASOPHILS NFR BLD AUTO: 1.1 % (ref 0–1)
BILIRUB SERPL-MCNC: 0.4 MG/DL (ref ?–1.2)
BUN SERPL-MCNC: 17 MG/DL (ref 7–18)
BUN/CREAT SERPL: 16 (ref 6–20)
CALCIUM SERPL-MCNC: 9.2 MG/DL (ref 8.5–10.1)
CHLORIDE SERPL-SCNC: 106 MMOL/L (ref 98–107)
CHOLEST SERPL-MCNC: 184 MG/DL (ref 0–200)
CHOLEST/HDLC SERPL: 3.3 {RATIO}
CO2 SERPL-SCNC: 29 MMOL/L (ref 21–32)
CREAT SERPL-MCNC: 1.05 MG/DL (ref 0.7–1.3)
DIFFERENTIAL METHOD BLD: ABNORMAL
EGFR (NO RACE VARIABLE) (RUSH/TITUS): 81 ML/MIN/1.73M2
EOSINOPHIL # BLD AUTO: 0.18 K/UL (ref 0–0.5)
EOSINOPHIL NFR BLD AUTO: 4 % (ref 1–4)
ERYTHROCYTE [DISTWIDTH] IN BLOOD BY AUTOMATED COUNT: 14.5 % (ref 11.5–14.5)
EST. AVERAGE GLUCOSE BLD GHB EST-MCNC: 126 MG/DL
GLOBULIN SER-MCNC: 4.2 G/DL (ref 2–4)
GLUCOSE SERPL-MCNC: 100 MG/DL (ref 74–106)
HBA1C MFR BLD HPLC: 6 % (ref 4.5–6.6)
HCT VFR BLD AUTO: 43.3 % (ref 40–54)
HDLC SERPL-MCNC: 56 MG/DL (ref 40–60)
HGB BLD-MCNC: 14.5 G/DL (ref 13.5–18)
IMM GRANULOCYTES # BLD AUTO: 0.01 K/UL (ref 0–0.04)
IMM GRANULOCYTES NFR BLD: 0.2 % (ref 0–0.4)
LDLC SERPL CALC-MCNC: 106 MG/DL
LDLC/HDLC SERPL: 1.9 {RATIO}
LYMPHOCYTES # BLD AUTO: 2.21 K/UL (ref 1–4.8)
LYMPHOCYTES NFR BLD AUTO: 49 % (ref 27–41)
MCH RBC QN AUTO: 30.5 PG (ref 27–31)
MCHC RBC AUTO-ENTMCNC: 33.5 G/DL (ref 32–36)
MCV RBC AUTO: 91 FL (ref 80–96)
MONOCYTES # BLD AUTO: 0.35 K/UL (ref 0–0.8)
MONOCYTES NFR BLD AUTO: 7.8 % (ref 2–6)
MPC BLD CALC-MCNC: 10.8 FL (ref 9.4–12.4)
NEUTROPHILS # BLD AUTO: 1.71 K/UL (ref 1.8–7.7)
NEUTROPHILS NFR BLD AUTO: 37.9 % (ref 53–65)
NONHDLC SERPL-MCNC: 128 MG/DL
NRBC # BLD AUTO: 0 X10E3/UL
NRBC, AUTO (.00): 0 %
PLATELET # BLD AUTO: 216 K/UL (ref 150–400)
POTASSIUM SERPL-SCNC: 3.6 MMOL/L (ref 3.5–5.1)
PROT SERPL-MCNC: 8.1 G/DL (ref 6.4–8.2)
PSA SERPL-MCNC: 1.46 NG/ML
RBC # BLD AUTO: 4.76 M/UL (ref 4.6–6.2)
SODIUM SERPL-SCNC: 138 MMOL/L (ref 136–145)
T4 FREE SERPL-MCNC: 0.72 NG/DL (ref 0.76–1.46)
TRIGL SERPL-MCNC: 109 MG/DL (ref 35–150)
TSH SERPL DL<=0.005 MIU/L-ACNC: 1.13 UIU/ML (ref 0.36–3.74)
VLDLC SERPL-MCNC: 22 MG/DL
WBC # BLD AUTO: 4.51 K/UL (ref 4.5–11)

## 2024-02-12 PROCEDURE — 3008F BODY MASS INDEX DOCD: CPT | Mod: CPTII,,, | Performed by: NURSE PRACTITIONER

## 2024-02-12 PROCEDURE — 80061 LIPID PANEL: CPT | Mod: ,,, | Performed by: CLINICAL MEDICAL LABORATORY

## 2024-02-12 PROCEDURE — 99214 OFFICE O/P EST MOD 30 MIN: CPT | Mod: ,,, | Performed by: NURSE PRACTITIONER

## 2024-02-12 PROCEDURE — 84439 ASSAY OF FREE THYROXINE: CPT | Mod: ,,, | Performed by: CLINICAL MEDICAL LABORATORY

## 2024-02-12 PROCEDURE — 1160F RVW MEDS BY RX/DR IN RCRD: CPT | Mod: CPTII,,, | Performed by: NURSE PRACTITIONER

## 2024-02-12 PROCEDURE — 3074F SYST BP LT 130 MM HG: CPT | Mod: CPTII,,, | Performed by: NURSE PRACTITIONER

## 2024-02-12 PROCEDURE — 83036 HEMOGLOBIN GLYCOSYLATED A1C: CPT | Mod: ,,, | Performed by: CLINICAL MEDICAL LABORATORY

## 2024-02-12 PROCEDURE — 3078F DIAST BP <80 MM HG: CPT | Mod: CPTII,,, | Performed by: NURSE PRACTITIONER

## 2024-02-12 PROCEDURE — 80050 GENERAL HEALTH PANEL: CPT | Mod: ,,, | Performed by: CLINICAL MEDICAL LABORATORY

## 2024-02-12 PROCEDURE — 1159F MED LIST DOCD IN RCRD: CPT | Mod: CPTII,,, | Performed by: NURSE PRACTITIONER

## 2024-02-12 PROCEDURE — G0103 PSA SCREENING: HCPCS | Mod: ,,, | Performed by: CLINICAL MEDICAL LABORATORY

## 2024-02-12 RX ORDER — ATORVASTATIN CALCIUM 20 MG/1
20 TABLET, FILM COATED ORAL DAILY
Qty: 90 TABLET | Refills: 3 | Status: SHIPPED | OUTPATIENT
Start: 2024-02-12 | End: 2025-02-11

## 2024-02-12 RX ORDER — TADALAFIL 10 MG/1
10 TABLET ORAL DAILY PRN
Qty: 10 TABLET | Refills: 3 | Status: SHIPPED | OUTPATIENT
Start: 2024-02-12 | End: 2024-04-12

## 2024-02-12 RX ORDER — AMLODIPINE BESYLATE 10 MG/1
10 TABLET ORAL DAILY
Qty: 90 TABLET | Refills: 3 | Status: SHIPPED | OUTPATIENT
Start: 2024-02-12

## 2024-02-12 RX ORDER — ASPIRIN 81 MG/1
81 TABLET ORAL DAILY
Qty: 90 TABLET | Refills: 3 | Status: SHIPPED | OUTPATIENT
Start: 2024-02-12 | End: 2025-02-11

## 2024-02-12 NOTE — LETTER
February 12, 2024      [facility]  [address] [facility] [address]  Phone: [phone]  Fax: [phone]       Patient: Mukesh Loera   YOB: 1965  Date of Visit: 02/12/2024    To Whom It May Concern:    GREGORY Loera  was at [facility] on 02/12/2024. The patient may return to work on 2/13/24 with no restrictions. If you have any questions or concerns, or if I can be of further assistance, please do not hesitate to contact me.    Sincerely,    LEONARDO Biggs

## 2024-02-12 NOTE — PROGRESS NOTES
Subjective:       Patient ID: Mukesh Loera is a 59 y.o. male.    Chief Complaint: No chief complaint on file.    Mr. Loera presents to clinic today with wife to establish care and for routine lab and screenings.   NO issues today.    Active Problem List with Overview Notes    Diagnosis Date Noted    Screening for malignant neoplasm of colon 02/12/2024    Screening for prostate cancer 02/12/2024    Hyperglycemia 02/12/2024    Low serum thyroid stimulating hormone (TSH) 12/12/2022    Erectile dysfunction 12/12/2022    S/P total knee arthroplasty, left 11/30/2022    Essential hypertension 11/22/2022    Dyslipidemia 11/22/2022        Review of Systems   Constitutional:  Negative for fatigue and fever.   HENT:  Negative for congestion, hearing loss, postnasal drip, rhinorrhea, sore throat, tinnitus and voice change.    Respiratory:  Negative for apnea, cough, choking, chest tightness and shortness of breath.    Cardiovascular:  Negative for chest pain, palpitations and leg swelling.   Gastrointestinal:  Negative for abdominal pain, constipation, diarrhea and nausea.   Genitourinary:  Negative for difficulty urinating.   Neurological:  Negative for dizziness, syncope, weakness and headaches.   Psychiatric/Behavioral:  Negative for sleep disturbance.         Objective:      Vitals:    02/12/24 1520   BP: 120/78   Pulse: 68   Resp: 20      Physical Exam  Vitals reviewed.   Constitutional:       Appearance: Normal appearance.   HENT:      Head: Normocephalic.      Right Ear: External ear normal.      Left Ear: External ear normal.      Nose: Nose normal.      Mouth/Throat:      Mouth: Mucous membranes are moist.      Pharynx: Oropharynx is clear.   Eyes:      Pupils: Pupils are equal, round, and reactive to light.   Cardiovascular:      Rate and Rhythm: Normal rate and regular rhythm.      Pulses: Normal pulses.      Heart sounds: Normal heart sounds.   Pulmonary:      Effort: Pulmonary effort is normal.       Breath sounds: Normal breath sounds.   Abdominal:      General: Bowel sounds are normal.      Palpations: Abdomen is soft.   Musculoskeletal:         General: Normal range of motion.      Cervical back: Normal range of motion.   Skin:     General: Skin is warm and dry.   Neurological:      Mental Status: He is alert and oriented to person, place, and time.   Psychiatric:         Mood and Affect: Mood normal.         Behavior: Behavior normal.       Assessment:       1. Essential hypertension    2. Dyslipidemia    3. Low TSH level    4. Hyperglycemia    5. Low serum thyroid stimulating hormone (TSH)    6. Erectile dysfunction, unspecified erectile dysfunction type    7. Screening for prostate cancer    8. Screening for malignant neoplasm of colon        Plan:     Problem List Items Addressed This Visit          Cardiac/Vascular    Essential hypertension - Primary (Chronic)    Current Assessment & Plan     Labs today- call results.  Continue Amlodipine.         Relevant Medications    amLODIPine (NORVASC) 10 MG tablet    aspirin (ECOTRIN) 81 MG EC tablet    Other Relevant Orders    Comprehensive Metabolic Panel    CBC Auto Differential    Dyslipidemia (Chronic)    Current Assessment & Plan     Lipid panel today.  Continue Lipitor 20mg daily         Relevant Medications    aspirin (ECOTRIN) 81 MG EC tablet    atorvastatin (LIPITOR) 20 MG tablet    Other Relevant Orders    Lipid Panel    Hemoglobin A1C       Renal/    Erectile dysfunction    Current Assessment & Plan     Continue Cialis 10mg PRN         Relevant Medications    tadalafiL (CIALIS) 10 MG tablet    Screening for prostate cancer    Relevant Orders    PSA, Screening       Endocrine    Low serum thyroid stimulating hormone (TSH)    Hyperglycemia    Current Assessment & Plan     A1C today         Relevant Orders    Hemoglobin A1C    RESOLVED: Low TSH level    Relevant Orders    TSH    T4, Free       GI    Screening for malignant neoplasm of colon    Relevant  Orders    Colonoscopy       Health Maintenance:  Health Maintenance Topics with due status: Not Due       Topic Last Completion Date    Hemoglobin A1c (Diabetic Prevention Screening) 12/01/2022    Lipid Panel 12/01/2022           Harper Mandujano   [facility]   2/12/24

## 2024-02-13 ENCOUNTER — TELEPHONE (OUTPATIENT)
Dept: FAMILY MEDICINE | Facility: CLINIC | Age: 59
End: 2024-02-13

## 2024-02-13 NOTE — PROGRESS NOTES
PSA normal. Cholesterol good. A1C is in prediabetic range. Weight loss, dietary changes. Can repeat in 6 mos to evaluate changes.

## 2025-01-28 ENCOUNTER — LAB VISIT (OUTPATIENT)
Dept: PRIMARY CARE CLINIC | Facility: CLINIC | Age: 60
End: 2025-01-28

## 2025-01-28 DIAGNOSIS — Z02.83 ENCOUNTER FOR DRUG SCREENING: Primary | ICD-10-CM

## 2025-01-28 PROCEDURE — 99000 SPECIMEN HANDLING OFFICE-LAB: CPT | Mod: ,,, | Performed by: NURSE PRACTITIONER

## 2025-01-28 NOTE — PROGRESS NOTES
Patient ID: Mukesh Loera is a 59 y.o. male.    Chief Complaint: No chief complaint on file.    History of Present Illness              Physical Exam              Assessment & Plan               1. Encounter for drug screening        No follow-ups on file.    This note was generated with the assistance of ambient listening technology. Verbal consent was obtained by the patient and accompanying visitor(s) for the recording of patient appointment to facilitate this note. I attest to having reviewed and edited the generated note for accuracy, though some syntax or spelling errors may persist. Please contact the author of this note for any clarification.

## 2025-06-12 ENCOUNTER — OFFICE VISIT (OUTPATIENT)
Dept: FAMILY MEDICINE | Facility: CLINIC | Age: 60
End: 2025-06-12
Payer: COMMERCIAL

## 2025-06-12 VITALS
WEIGHT: 267 LBS | TEMPERATURE: 98 F | RESPIRATION RATE: 18 BRPM | HEIGHT: 75 IN | OXYGEN SATURATION: 96 % | DIASTOLIC BLOOD PRESSURE: 87 MMHG | SYSTOLIC BLOOD PRESSURE: 135 MMHG | HEART RATE: 74 BPM | BODY MASS INDEX: 33.2 KG/M2

## 2025-06-12 DIAGNOSIS — E34.9 TESTOSTERONE DEFICIENCY: ICD-10-CM

## 2025-06-12 DIAGNOSIS — Z12.5 SCREENING PSA (PROSTATE SPECIFIC ANTIGEN): ICD-10-CM

## 2025-06-12 DIAGNOSIS — E66.9 OBESITY, UNSPECIFIED CLASS, UNSPECIFIED OBESITY TYPE, UNSPECIFIED WHETHER SERIOUS COMORBIDITY PRESENT: ICD-10-CM

## 2025-06-12 DIAGNOSIS — E78.5 DYSLIPIDEMIA: ICD-10-CM

## 2025-06-12 DIAGNOSIS — N52.9 ERECTILE DYSFUNCTION, UNSPECIFIED ERECTILE DYSFUNCTION TYPE: ICD-10-CM

## 2025-06-12 DIAGNOSIS — Z13.1 SCREENING FOR DIABETES MELLITUS (DM): Primary | ICD-10-CM

## 2025-06-12 DIAGNOSIS — I10 ESSENTIAL HYPERTENSION: ICD-10-CM

## 2025-06-12 LAB
EST. AVERAGE GLUCOSE BLD GHB EST-MCNC: 134 MG/DL
HBA1C MFR BLD HPLC: 6.3 %
PSA SERPL-MCNC: 1.19 NG/ML
TESTOST SERPL-MCNC: 164.8 NG/DL (ref 220.9–715.8)

## 2025-06-12 PROCEDURE — 84403 ASSAY OF TOTAL TESTOSTERONE: CPT | Mod: ,,, | Performed by: CLINICAL MEDICAL LABORATORY

## 2025-06-12 PROCEDURE — G0103 PSA SCREENING: HCPCS | Mod: ,,, | Performed by: CLINICAL MEDICAL LABORATORY

## 2025-06-12 PROCEDURE — 83036 HEMOGLOBIN GLYCOSYLATED A1C: CPT | Mod: ,,, | Performed by: CLINICAL MEDICAL LABORATORY

## 2025-06-12 PROCEDURE — 99214 OFFICE O/P EST MOD 30 MIN: CPT | Mod: ,,, | Performed by: INTERNAL MEDICINE

## 2025-06-12 PROCEDURE — 1159F MED LIST DOCD IN RCRD: CPT | Mod: CPTII,,, | Performed by: INTERNAL MEDICINE

## 2025-06-12 PROCEDURE — 3079F DIAST BP 80-89 MM HG: CPT | Mod: CPTII,,, | Performed by: INTERNAL MEDICINE

## 2025-06-12 PROCEDURE — 3075F SYST BP GE 130 - 139MM HG: CPT | Mod: CPTII,,, | Performed by: INTERNAL MEDICINE

## 2025-06-12 PROCEDURE — 3008F BODY MASS INDEX DOCD: CPT | Mod: CPTII,,, | Performed by: INTERNAL MEDICINE

## 2025-06-12 RX ORDER — TADALAFIL 20 MG/1
20 TABLET ORAL DAILY PRN
Qty: 10 TABLET | Refills: 6 | Status: SHIPPED | OUTPATIENT
Start: 2025-06-12

## 2025-06-12 RX ORDER — ASPIRIN 81 MG/1
81 TABLET ORAL DAILY
Qty: 90 TABLET | Refills: 3 | Status: SHIPPED | OUTPATIENT
Start: 2025-06-12 | End: 2026-06-12

## 2025-06-12 RX ORDER — AMLODIPINE BESYLATE 10 MG/1
10 TABLET ORAL DAILY
Qty: 90 TABLET | Refills: 3 | Status: SHIPPED | OUTPATIENT
Start: 2025-06-12

## 2025-06-12 RX ORDER — PHENTERMINE HYDROCHLORIDE 37.5 MG/1
37.5 TABLET ORAL
Qty: 30 TABLET | Refills: 0 | Status: SHIPPED | OUTPATIENT
Start: 2025-06-12

## 2025-06-12 RX ORDER — ATORVASTATIN CALCIUM 20 MG/1
20 TABLET, FILM COATED ORAL DAILY
Qty: 90 TABLET | Refills: 3 | Status: SHIPPED | OUTPATIENT
Start: 2025-06-12 | End: 2026-06-12

## 2025-06-12 NOTE — PROGRESS NOTES
"Subjective:       Patient ID: Mukesh Loera is a 60 y.o. male.    Chief Complaint: Establish Care    History of Present Illness    CHIEF COMPLAINT:  Patient presents today for medication refills and weight gain concerns.    WEIGHT MANAGEMENT:  He expresses concerns about weight gain and reports actively trying to lose weight.    SEXUAL HEALTH:  He reports erectile dysfunction which started within the last year.    MEDICAL HISTORY:  He has a history of hyperlipidemia, hypertension, and knee replacement surgery.    MEDICATIONS:  He takes aspirin daily.    SOCIAL HISTORY:  He consumes alcohol daily: 3-4 beers on weekdays and 6-8 beers on weekends.         Current Medications:  Current Medications[1]           ROS  Twelve point system reviewed, unremarkable except for stated above in HPI.        Objective:         Vitals:    06/12/25 1353   BP: 135/87   BP Location: Left arm   Patient Position: Sitting   Pulse: 74   Resp: 18   Temp: 97.8 °F (36.6 °C)   TempSrc: Temporal   SpO2: 96%   Weight: 121.1 kg (267 lb)   Height: 6' 3" (1.905 m)        Physical Exam     Patient is awake alert oriented person place and  Lungs are clear to auscultation bilaterally no crackles or wheezes   Cardiovascular S1-S2 regular rate and rhythm no murmurs rubs or gallops   Abdomen is soft positive bowel sounds nontender, extremities no clubbing cyanosis edema  Neuro no focal neurological deficits  Skin warm and dry.     Last Labs:     No visits with results within 1 Month(s) from this visit.   Latest known visit with results is:   Office Visit on 02/12/2024   Component Date Value    Sodium 02/12/2024 138     Potassium 02/12/2024 3.6     Chloride 02/12/2024 106     CO2 02/12/2024 29     Anion Gap 02/12/2024 7     Glucose 02/12/2024 100     BUN 02/12/2024 17     Creatinine 02/12/2024 1.05     BUN/Creatinine Ratio 02/12/2024 16     Calcium 02/12/2024 9.2     Total Protein 02/12/2024 8.1     Albumin 02/12/2024 3.9     Globulin 02/12/2024 " 4.2 (H)     A/G Ratio 02/12/2024 0.9     Bilirubin, Total 02/12/2024 0.4     Alk Phos 02/12/2024 96     ALT 02/12/2024 26     AST 02/12/2024 13 (L)     eGFR 02/12/2024 81     Triglycerides 02/12/2024 109     Cholesterol 02/12/2024 184     HDL Cholesterol 02/12/2024 56     Cholesterol/HDL Ratio (R* 02/12/2024 3.3     Non-HDL 02/12/2024 128     LDL Calculated 02/12/2024 106     LDL/HDL 02/12/2024 1.9     VLDL 02/12/2024 22     TSH 02/12/2024 1.130     Free T4 02/12/2024 0.72 (L)     Hemoglobin A1C 02/12/2024 6.0     Estimated Average Glucose 02/12/2024 126     PSA Total 02/12/2024 1.460     WBC 02/12/2024 4.51     RBC 02/12/2024 4.76     Hemoglobin 02/12/2024 14.5     Hematocrit 02/12/2024 43.3     MCV 02/12/2024 91.0     MCH 02/12/2024 30.5     MCHC 02/12/2024 33.5     RDW 02/12/2024 14.5     Platelet Count 02/12/2024 216     MPV 02/12/2024 10.8     Neutrophils % 02/12/2024 37.9 (L)     Lymphocytes % 02/12/2024 49.0 (H)     Monocytes % 02/12/2024 7.8 (H)     Eosinophils % 02/12/2024 4.0     Basophils % 02/12/2024 1.1 (H)     Immature Granulocytes % 02/12/2024 0.2     nRBC, Auto 02/12/2024 0.0     Neutrophils, Abs 02/12/2024 1.71 (L)     Lymphocytes, Absolute 02/12/2024 2.21     Monocytes, Absolute 02/12/2024 0.35     Eosinophils, Absolute 02/12/2024 0.18     Basophils, Absolute 02/12/2024 0.05     Immature Granulocytes, A* 02/12/2024 0.01     nRBC, Absolute 02/12/2024 0.00     Diff Type 02/12/2024 Auto        Last Imaging:  US Lower Extremity Veins Left  Narrative: EXAMINATION:  US LOWER EXTREMITY VEINS LEFT    CLINICAL HISTORY:  Generalized edema    TECHNIQUE:  Duplex and color flow Doppler and dynamic compression was performed of the lower extremity veins was performed.    COMPARISON:  None.    FINDINGS:  Nonocclusive thrombus in the left posterior tibial vein near the ankle.  No other evidence of echogenic, non-compressible thrombus seen in the remaining veins of the extremities.  Color Doppler venous waveform  pattern is within normal limits.  Impression: Venous thrombosis as described above.    Ultrasound images stored and captured.    Electronically signed by: Elian Tanner  Date:    01/05/2023  Time:    13:28  X-Ray Knee 1 or 2 View Left  See Procedure Notes for results.     IMPRESSION: Please see Ortho procedure notes for report.      This procedure was auto-finalized by: Virtual Radiologist         **Labs and x-rays personally reviewed by me    ** reviewed           Assessment & Plan:   Assessment & Plan    I10 Essential hypertension  E78.5 Dyslipidemia  N52.9 Erectile dysfunction, unspecified erectile dysfunction type  Z13.1 Screening for diabetes mellitus (DM)  E34.9 Testosterone deficiency  Z12.5 Screening PSA (prostate specific antigen)    IMPRESSION:  - Assessed weight gain and potential impact on overall health.  - Considered potential causes for erectile dysfunction, including weight gain, alcohol consumption, and possible underlying conditions like diabetes.    N52.9 ERECTILE DYSFUNCTION, UNSPECIFIED ERECTILE DYSFUNCTION TYPE:  - Educated on negative impact of daily alcohol consumption on erectile function and importance of reducing intake for overall health improvement.  - Increased Cialis to 20 mg daily.    Z13.1 SCREENING FOR DIABETES MELLITUS (DM):  - Ordered labs to check for diabetes.    E34.9 TESTOSTERONE DEFICIENCY:  - Ordered fasting testosterone level (to be done at next visit).    Z12.5 SCREENING PSA (PROSTATE SPECIFIC ANTIGEN):  - Ordered PSA test to check prostate.           1. Screening for diabetes mellitus (DM)  -     Hemoglobin A1C; Future; Expected date: 06/12/2025    2. Essential hypertension  -     amLODIPine (NORVASC) 10 MG tablet; Take 1 tablet (10 mg total) by mouth once daily.  Dispense: 90 tablet; Refill: 3  -     aspirin (ECOTRIN) 81 MG EC tablet; Take 1 tablet (81 mg total) by mouth once daily.  Dispense: 90 tablet; Refill: 3    3. Dyslipidemia  -     aspirin (ECOTRIN) 81 MG EC  tablet; Take 1 tablet (81 mg total) by mouth once daily.  Dispense: 90 tablet; Refill: 3  -     atorvastatin (LIPITOR) 20 MG tablet; Take 1 tablet (20 mg total) by mouth once daily.  Dispense: 90 tablet; Refill: 3    4. Erectile dysfunction, unspecified erectile dysfunction type  -     tadalafiL (CIALIS) 20 MG Tab; Take 1 tablet (20 mg total) by mouth daily as needed for Erectile Dysfunction.  Dispense: 10 tablet; Refill: 6    5. Testosterone deficiency  -     Testosterone,Total; Future; Expected date: 06/12/2025    6. Screening PSA (prostate specific antigen)  -     PSA, Screening; Future; Expected date: 06/12/2025    7. Obesity, unspecified class, unspecified obesity type, unspecified whether serious comorbidity present  -     phentermine (ADIPEX-P) 37.5 mg tablet; Take 1 tablet (37.5 mg total) by mouth before breakfast.  Dispense: 30 tablet; Refill: 0            Lewis Kumar MD  This note was generated with the assistance of ambient listening technology. Verbal consent was obtained by the patient and accompanying visitor(s) for the recording of patient appointment to facilitate this note. I attest to having reviewed and edited the generated note for accuracy, though some syntax or spelling errors may persist. Please contact the author of this note for any clarification.            [1]   Current Outpatient Medications:     amLODIPine (NORVASC) 10 MG tablet, Take 1 tablet (10 mg total) by mouth once daily., Disp: 90 tablet, Rfl: 3    aspirin (ECOTRIN) 81 MG EC tablet, Take 1 tablet (81 mg total) by mouth once daily., Disp: 90 tablet, Rfl: 3    atorvastatin (LIPITOR) 20 MG tablet, Take 1 tablet (20 mg total) by mouth once daily., Disp: 90 tablet, Rfl: 3    phentermine (ADIPEX-P) 37.5 mg tablet, Take 1 tablet (37.5 mg total) by mouth before breakfast., Disp: 30 tablet, Rfl: 0    tadalafiL (CIALIS) 20 MG Tab, Take 1 tablet (20 mg total) by mouth daily as needed for Erectile Dysfunction., Disp: 10 tablet, Rfl:  6

## 2025-06-12 NOTE — LETTER
June 12, 2025      [facility]  [address] [place] [address]  Phone: [phone]  Fax: [phone]       Patient: Mukesh Loera   YOB: 1965  Date of Visit: 06/12/2025    To Whom It May Concern:    GREGORY Loera  was at [facility] on 06/12/2025. The patient may return to work/school on 06/13/25 with no restrictions. If you have any questions or concerns, or if I can be of further assistance, please do not hesitate to contact me.    Sincerely,    Dr Lewis Elias RN

## 2025-06-13 ENCOUNTER — RESULTS FOLLOW-UP (OUTPATIENT)
Dept: FAMILY MEDICINE | Facility: CLINIC | Age: 60
End: 2025-06-13
Payer: COMMERCIAL

## 2025-06-19 ENCOUNTER — OFFICE VISIT (OUTPATIENT)
Dept: FAMILY MEDICINE | Facility: CLINIC | Age: 60
End: 2025-06-19
Payer: COMMERCIAL

## 2025-06-19 VITALS
TEMPERATURE: 98 F | RESPIRATION RATE: 17 BRPM | OXYGEN SATURATION: 96 % | HEIGHT: 75 IN | WEIGHT: 269 LBS | BODY MASS INDEX: 33.45 KG/M2 | SYSTOLIC BLOOD PRESSURE: 127 MMHG | DIASTOLIC BLOOD PRESSURE: 79 MMHG | HEART RATE: 74 BPM

## 2025-06-19 DIAGNOSIS — I10 ESSENTIAL HYPERTENSION: ICD-10-CM

## 2025-06-19 DIAGNOSIS — E78.5 DYSLIPIDEMIA: Primary | ICD-10-CM

## 2025-06-19 DIAGNOSIS — E34.9 TESTOSTERONE DEFICIENCY: ICD-10-CM

## 2025-06-19 PROCEDURE — 99213 OFFICE O/P EST LOW 20 MIN: CPT | Mod: ,,, | Performed by: INTERNAL MEDICINE

## 2025-06-19 PROCEDURE — 3044F HG A1C LEVEL LT 7.0%: CPT | Mod: CPTII,,, | Performed by: INTERNAL MEDICINE

## 2025-06-19 PROCEDURE — 3074F SYST BP LT 130 MM HG: CPT | Mod: CPTII,,, | Performed by: INTERNAL MEDICINE

## 2025-06-19 PROCEDURE — 1159F MED LIST DOCD IN RCRD: CPT | Mod: CPTII,,, | Performed by: INTERNAL MEDICINE

## 2025-06-19 PROCEDURE — 3078F DIAST BP <80 MM HG: CPT | Mod: CPTII,,, | Performed by: INTERNAL MEDICINE

## 2025-06-19 PROCEDURE — 3008F BODY MASS INDEX DOCD: CPT | Mod: CPTII,,, | Performed by: INTERNAL MEDICINE

## 2025-06-19 RX ORDER — SYRINGE WITH NEEDLE, 1 ML 27GX1/2"
1 SYRINGE, EMPTY DISPOSABLE MISCELLANEOUS
Qty: 20 EACH | Refills: 0 | Status: SHIPPED | OUTPATIENT
Start: 2025-06-19

## 2025-06-19 RX ORDER — TESTOSTERONE CYPIONATE 200 MG/ML
200 INJECTION, SOLUTION INTRAMUSCULAR
Qty: 10 ML | Refills: 0 | Status: SHIPPED | OUTPATIENT
Start: 2025-06-19 | End: 2026-03-26

## 2025-06-19 NOTE — LETTER
June 19, 2025      [facility]  [address] [place] [address]  Phone: [phone]  Fax: [phone]       Patient: Mukesh Loera   YOB: 1965  Date of Visit: 06/19/2025    To Whom It May Concern:    GREGORY Loera  was at [facility] on 06/19/2025. He may return to work/school on 06/20/2025 with no restrictions. If you have any questions or concerns, or if I can be of further assistance, please do not hesitate to contact me.    Sincerely,    Mookie Islas LPN

## 2025-06-19 NOTE — PROGRESS NOTES
"Subjective:       Patient ID: Mukesh Loera is a 60 y.o. male.    Chief Complaint: Diabetes and Health Maintenance (Colorectal Cancer Screening Never done )    History of Present Illness    CHIEF COMPLAINT:  Patient presents today for follow up    LOW TESTOSTERONE:  He reports persistent fatigue, lack of energy, generalized weakness, and diminished vitality consistent with testosterone deficiency.         Current Medications:  Current Medications[1]           ROS  Twelve point system reviewed, unremarkable except for stated above in HPI.        Objective:         Vitals:    06/19/25 0817   BP: 127/79   BP Location: Left arm   Patient Position: Sitting   Pulse: 74   Resp: 17   Temp: 97.6 °F (36.4 °C)   TempSrc: Temporal   SpO2: 96%   Weight: 122 kg (269 lb)   Height: 6' 3" (1.905 m)        Physical Exam     Patient is awake alert oriented person place and  Lungs are clear to auscultation bilaterally no crackles or wheezes   Cardiovascular S1-S2 regular rate and rhythm no murmurs rubs or gallops   Abdomen is soft positive bowel sounds nontender, extremities no clubbing cyanosis edema  Neuro no focal neurological deficits  Skin warm and dry.     Last Labs:     Office Visit on 06/12/2025   Component Date Value    Hemoglobin A1C 06/12/2025 6.3     Estimated Average Glucose 06/12/2025 134     Testosterone 06/12/2025 164.8 (L)     PSA Total 06/12/2025 1.193        Last Imaging:  US Lower Extremity Veins Left  Narrative: EXAMINATION:  US LOWER EXTREMITY VEINS LEFT    CLINICAL HISTORY:  Generalized edema    TECHNIQUE:  Duplex and color flow Doppler and dynamic compression was performed of the lower extremity veins was performed.    COMPARISON:  None.    FINDINGS:  Nonocclusive thrombus in the left posterior tibial vein near the ankle.  No other evidence of echogenic, non-compressible thrombus seen in the remaining veins of the extremities.  Color Doppler venous waveform pattern is within normal limits.  Impression: " "Venous thrombosis as described above.    Ultrasound images stored and captured.    Electronically signed by: Elianlorenzo Zaratebrenden  Date:    01/05/2023  Time:    13:28  X-Ray Knee 1 or 2 View Left  See Procedure Notes for results.     IMPRESSION: Please see Ortho procedure notes for report.      This procedure was auto-finalized by: Virtual Radiologist         **Labs and x-rays personally reviewed by me    ** reviewed           Assessment & Plan:   Assessment & Plan    E78.5 Dyslipidemia  I10 Essential hypertension    IMPRESSION:  - Testosterone level was low, indicating testosterone deficiency.           1. Dyslipidemia    2. Essential hypertension    3. Testosterone deficiency  -     testosterone cypionate (DEPOTESTOTERONE CYPIONATE) 200 mg/mL injection; Inject 1 mL (200 mg total) into the muscle every 28 days.  Dispense: 10 mL; Refill: 0  -     syringe with needle 1 mL 25 gauge x 1" Syrg; 1 each by Misc.(Non-Drug; Combo Route) route every 28 days.  Dispense: 20 each; Refill: 0            Lewis Kumar MD  This note was generated with the assistance of ambient listening technology. Verbal consent was obtained by the patient and accompanying visitor(s) for the recording of patient appointment to facilitate this note. I attest to having reviewed and edited the generated note for accuracy, though some syntax or spelling errors may persist. Please contact the author of this note for any clarification.            [1]   Current Outpatient Medications:     amLODIPine (NORVASC) 10 MG tablet, Take 1 tablet (10 mg total) by mouth once daily., Disp: 90 tablet, Rfl: 3    aspirin (ECOTRIN) 81 MG EC tablet, Take 1 tablet (81 mg total) by mouth once daily., Disp: 90 tablet, Rfl: 3    atorvastatin (LIPITOR) 20 MG tablet, Take 1 tablet (20 mg total) by mouth once daily., Disp: 90 tablet, Rfl: 3    phentermine (ADIPEX-P) 37.5 mg tablet, Take 1 tablet (37.5 mg total) by mouth before breakfast., Disp: 30 tablet, Rfl: 0    " "tadalafiL (CIALIS) 20 MG Tab, Take 1 tablet (20 mg total) by mouth daily as needed for Erectile Dysfunction., Disp: 10 tablet, Rfl: 6    syringe with needle 1 mL 25 gauge x 1" Syrg, 1 each by Misc.(Non-Drug; Combo Route) route every 28 days., Disp: 20 each, Rfl: 0    testosterone cypionate (DEPOTESTOTERONE CYPIONATE) 200 mg/mL injection, Inject 1 mL (200 mg total) into the muscle every 28 days., Disp: 10 mL, Rfl: 0    "

## 2025-07-17 ENCOUNTER — OFFICE VISIT (OUTPATIENT)
Dept: FAMILY MEDICINE | Facility: CLINIC | Age: 60
End: 2025-07-17
Payer: COMMERCIAL

## 2025-07-17 VITALS
OXYGEN SATURATION: 98 % | WEIGHT: 265 LBS | RESPIRATION RATE: 17 BRPM | DIASTOLIC BLOOD PRESSURE: 77 MMHG | HEIGHT: 75 IN | BODY MASS INDEX: 32.95 KG/M2 | TEMPERATURE: 97 F | HEART RATE: 76 BPM | SYSTOLIC BLOOD PRESSURE: 127 MMHG

## 2025-07-17 DIAGNOSIS — N52.9 ERECTILE DYSFUNCTION, UNSPECIFIED ERECTILE DYSFUNCTION TYPE: ICD-10-CM

## 2025-07-17 DIAGNOSIS — E34.9 TESTOSTERONE DEFICIENCY: ICD-10-CM

## 2025-07-17 PROCEDURE — 3008F BODY MASS INDEX DOCD: CPT | Mod: CPTII,,, | Performed by: INTERNAL MEDICINE

## 2025-07-17 PROCEDURE — 99213 OFFICE O/P EST LOW 20 MIN: CPT | Mod: ,,, | Performed by: INTERNAL MEDICINE

## 2025-07-17 PROCEDURE — 3078F DIAST BP <80 MM HG: CPT | Mod: CPTII,,, | Performed by: INTERNAL MEDICINE

## 2025-07-17 PROCEDURE — 3044F HG A1C LEVEL LT 7.0%: CPT | Mod: CPTII,,, | Performed by: INTERNAL MEDICINE

## 2025-07-17 PROCEDURE — 3074F SYST BP LT 130 MM HG: CPT | Mod: CPTII,,, | Performed by: INTERNAL MEDICINE

## 2025-07-17 PROCEDURE — 1159F MED LIST DOCD IN RCRD: CPT | Mod: CPTII,,, | Performed by: INTERNAL MEDICINE

## 2025-07-17 RX ORDER — TADALAFIL 20 MG/1
20 TABLET ORAL
Qty: 10 TABLET | Refills: 6 | Status: SHIPPED | OUTPATIENT
Start: 2025-07-17

## 2025-07-17 RX ORDER — TESTOSTERONE CYPIONATE 200 MG/ML
200 INJECTION, SOLUTION INTRAMUSCULAR
Qty: 10 ML | Refills: 0 | Status: SHIPPED | OUTPATIENT
Start: 2025-07-17 | End: 2026-04-23

## 2025-07-17 NOTE — PROGRESS NOTES
"Subjective:       Patient ID: Mukesh Loera is a 60 y.o. male.    Chief Complaint: Dyslipidemia and Health Maintenance (Colorectal Cancer Screening Never done )    History of Present Illness    CHIEF COMPLAINT:  Patient presents today for follow up    ENDOCRINE:  He reports low energy associated with low testosterone. A1C indicates pre-diabetes status.    FAMILY HISTORY:  Mother has history of diabetes mellitus    MEDICATIONS:  He previously discontinued Adipex for weight management due to dizziness. He initially declined the medication due to concerns about potential side effects including palpitations.         Current Medications:  Current Medications[1]           ROS  Twelve point system reviewed, unremarkable except for stated above in HPI.        Objective:         Vitals:    07/17/25 0904   BP: 127/77   BP Location: Left arm   Patient Position: Sitting   Pulse: 76   Resp: 17   Temp: 97.1 °F (36.2 °C)   TempSrc: Temporal   SpO2: 98%   Weight: 120.2 kg (265 lb)   Height: 6' 3" (1.905 m)        Physical Exam     Patient is awake alert oriented person place and  Lungs are clear to auscultation bilaterally no crackles or wheezes   Cardiovascular S1-S2 regular rate and rhythm no murmurs rubs or gallops   Abdomen is soft positive bowel sounds nontender, extremities no clubbing cyanosis edema  Neuro no focal neurological deficits  Skin warm and dry.     Last Labs:     No visits with results within 1 Month(s) from this visit.   Latest known visit with results is:   Office Visit on 06/12/2025   Component Date Value    Hemoglobin A1C 06/12/2025 6.3     Estimated Average Glucose 06/12/2025 134     Testosterone 06/12/2025 164.8 (L)     PSA Total 06/12/2025 1.193        Last Imaging:  US Lower Extremity Veins Left  Narrative: EXAMINATION:  US LOWER EXTREMITY VEINS LEFT    CLINICAL HISTORY:  Generalized edema    TECHNIQUE:  Duplex and color flow Doppler and dynamic compression was performed of the lower extremity " veins was performed.    COMPARISON:  None.    FINDINGS:  Nonocclusive thrombus in the left posterior tibial vein near the ankle.  No other evidence of echogenic, non-compressible thrombus seen in the remaining veins of the extremities.  Color Doppler venous waveform pattern is within normal limits.  Impression: Venous thrombosis as described above.    Ultrasound images stored and captured.    Electronically signed by: Elian Tanner  Date:    01/05/2023  Time:    13:28  X-Ray Knee 1 or 2 View Left  See Procedure Notes for results.     IMPRESSION: Please see Ortho procedure notes for report.      This procedure was auto-finalized by: Virtual Radiologist         **Labs and x-rays personally reviewed by me    ** reviewed           Assessment & Plan:   Assessment & Plan    N52.9 Erectile dysfunction, unspecified erectile dysfunction type    IMPRESSION:  - PSA normal, testosterone level low.  - A1C indicates prediabetes, not requiring medication at this time.  - Diagnosed testosterone deficiency, recommending testosterone replacement therapy.  - Ruled out and discontinued Adipex due to side effects including palpitations in approximately 10% of patients and dizziness.    N52.9 ERECTILE DYSFUNCTION, UNSPECIFIED ERECTILE DYSFUNCTION TYPE:  - Discussed the connection between low testosterone and lack of energy.  - Started testosterone injection therapy to be administered once a month.  - Prescription sent to patient's pharmacy.           1. Erectile dysfunction, unspecified erectile dysfunction type    2. Testosterone deficiency            Lewis Kumar MD  This note was generated with the assistance of ambient listening technology. Verbal consent was obtained by the patient and accompanying visitor(s) for the recording of patient appointment to facilitate this note. I attest to having reviewed and edited the generated note for accuracy, though some syntax or spelling errors may persist. Please contact the author  "of this note for any clarification.            [1]   Current Outpatient Medications:     amLODIPine (NORVASC) 10 MG tablet, Take 1 tablet (10 mg total) by mouth once daily., Disp: 90 tablet, Rfl: 3    aspirin (ECOTRIN) 81 MG EC tablet, Take 1 tablet (81 mg total) by mouth once daily., Disp: 90 tablet, Rfl: 3    atorvastatin (LIPITOR) 20 MG tablet, Take 1 tablet (20 mg total) by mouth once daily., Disp: 90 tablet, Rfl: 3    phentermine (ADIPEX-P) 37.5 mg tablet, Take 1 tablet (37.5 mg total) by mouth before breakfast., Disp: 30 tablet, Rfl: 0    syringe with needle 1 mL 25 gauge x 1" Syrg, 1 each by Misc.(Non-Drug; Combo Route) route every 28 days., Disp: 20 each, Rfl: 0    tadalafiL (CIALIS) 20 MG Tab, Take 1 tablet (20 mg total) by mouth daily as needed for Erectile Dysfunction., Disp: 10 tablet, Rfl: 6    testosterone cypionate (DEPOTESTOTERONE CYPIONATE) 200 mg/mL injection, Inject 1 mL (200 mg total) into the muscle every 28 days., Disp: 10 mL, Rfl: 0    "

## 2025-07-17 NOTE — LETTER
July 17, 2025      [facility]  [address] [place] [id number]  Phone: [phone]  Fax: [phone]       Patient: Mukesh Loera   YOB: 1965  Date of Visit: 07/17/2025    To Whom It May Concern:    GREGORY Loera  was at [facility] on 07/17/2025. He may return to work/school on 07/17/2025 with no restrictions. If you have any questions or concerns, or if I can be of further assistance, please do not hesitate to contact me.    Sincerely,    Hollie Talamantes RN

## (undated) DEVICE — KIT TOTAL KNEE RUSH

## (undated) DEVICE — DRAPE INCISE IOBAN 2 23X23IN

## (undated) DEVICE — SKIN STAPLER PMR35

## (undated) DEVICE — SYS KNEE EPAK PIN ATTUNE
Type: IMPLANTABLE DEVICE | Site: KNEE | Status: NON-FUNCTIONAL
Removed: 2022-11-30

## (undated) DEVICE — BLADE PERFORMANCE SAG 21X90MM

## (undated) DEVICE — PAD CAST SPECIALIST STRL 3

## (undated) DEVICE — GLOVE BIOGEL SKINSENSE PI 7.0

## (undated) DEVICE — BANDAGE ESMARK 6X12

## (undated) DEVICE — GLOVE BIOGEL SKINSENSE PI 7.5

## (undated) DEVICE — GLOVE 7.0 PROTEXIS PI BLUE

## (undated) DEVICE — KIT EVACUATOR 3 SPR  DRN 400CC

## (undated) DEVICE — CANISTER SUCTION MEDI-VAC 12L

## (undated) DEVICE — GLOVE BIOGEL SKINSENSE PI 6.5

## (undated) DEVICE — KIT IRR SUCTION HND PIECE

## (undated) DEVICE — NDL FILTER 5 MICRON 19GA1IN B-

## (undated) DEVICE — PIN SCHANZ 3X100MM
Type: IMPLANTABLE DEVICE | Site: KNEE | Status: NON-FUNCTIONAL
Removed: 2022-11-30

## (undated) DEVICE — PIN SCHANZ 4X130MM
Type: IMPLANTABLE DEVICE | Site: KNEE | Status: NON-FUNCTIONAL
Removed: 2022-11-30

## (undated) DEVICE — OVERLAY MATTRESS WAFFLE

## (undated) DEVICE — SCRUB DYNA-HEX LIQ 4% CHG 4OZ

## (undated) DEVICE — GLOVE BIOGEL SKINSENSE PI 8.0

## (undated) DEVICE — GOWN TOGA SYS PEELWY ZIP 2 XL

## (undated) DEVICE — SOL NACL IRR 3000ML

## (undated) DEVICE — SCRUB 10% POVIDONE IODINE 4OZ

## (undated) DEVICE — NDL QUINCKE SPINAL 20G 3.5IN

## (undated) DEVICE — SOLIDIFIER BTL W/TREAT 1500CC

## (undated) DEVICE — SUT #2 TI-CRON HGS-21 30IN

## (undated) DEVICE — SYR ONLY LUER LOCK 20CC

## (undated) DEVICE — SPHERE MARKER REFLECTIVE DISP

## (undated) DEVICE — SOL NACL IRR 1000ML BTL

## (undated) DEVICE — DRESSING AQUACEL FOAM RECT 6X6

## (undated) DEVICE — TRAY SKIN SCRUB WET PREMIUM

## (undated) DEVICE — SUT 2-0 VICRYL / CT-1

## (undated) DEVICE — GLOVE 7.5 PROTEXIS PI BLUE

## (undated) DEVICE — BANDAGE SURE-WRAP 6INX5YD

## (undated) DEVICE — BLADE RECIP DOUBLE SIDED

## (undated) DEVICE — DRAPE THREE-QTR REINF 53X77IN

## (undated) DEVICE — TOWER MIX CEMENT BONE SMARTMIX

## (undated) DEVICE — WRAP KNEE 4 ICE PACK L XL

## (undated) DEVICE — WRAP SELF ADH. COBAN 4X5YD

## (undated) DEVICE — TOURNIQUET SB QC SP 34X4IN

## (undated) DEVICE — COMPR KNEE STRAIGHT STAY 24IN